# Patient Record
Sex: FEMALE | Race: ASIAN | Employment: FULL TIME | URBAN - METROPOLITAN AREA
[De-identification: names, ages, dates, MRNs, and addresses within clinical notes are randomized per-mention and may not be internally consistent; named-entity substitution may affect disease eponyms.]

---

## 2017-04-03 ENCOUNTER — OFFICE VISIT (OUTPATIENT)
Dept: OBGYN CLINIC | Age: 37
End: 2017-04-03

## 2017-04-03 VITALS
HEIGHT: 66 IN | BODY MASS INDEX: 28.93 KG/M2 | WEIGHT: 180 LBS | SYSTOLIC BLOOD PRESSURE: 120 MMHG | DIASTOLIC BLOOD PRESSURE: 80 MMHG

## 2017-04-03 DIAGNOSIS — N83.202 LEFT OVARIAN CYST: ICD-10-CM

## 2017-04-03 DIAGNOSIS — R10.2 PELVIC PAIN IN FEMALE: Primary | ICD-10-CM

## 2017-04-03 NOTE — PATIENT INSTRUCTIONS
Pelvic Pain: Care Instructions  Your Care Instructions    Pelvic pain, or pain in the lower belly, can have many causes. Often pelvic pain is not serious and gets better in a few days. If your pain continues or gets worse, you may need tests and treatment. Tell your doctor about any new symptoms. These may be signs of a serious problem. Follow-up care is a key part of your treatment and safety. Be sure to make and go to all appointments, and call your doctor if you are having problems. It's also a good idea to know your test results and keep a list of the medicines you take. How can you care for yourself at home? · Rest until you feel better. Lie down, and raise your legs by placing a pillow under your knees. · Drink plenty of fluids. You may find that small, frequent sips are easier on your stomach than if you drink a lot at once. Avoid drinks with carbonation or caffeine, such as soda pop, tea, or coffee. · Try eating several small meals instead of 2 or 3 large ones. Eat mild foods, such as rice, dry toast or crackers, bananas, and applesauce. Avoid fatty and spicy foods, other fruits, and alcohol until 48 hours after your symptoms have gone away. · Take an over-the-counter pain medicine, such as acetaminophen (Tylenol), ibuprofen (Advil, Motrin), or naproxen (Aleve). Read and follow all instructions on the label. · Do not take two or more pain medicines at the same time unless the doctor told you to. Many pain medicines have acetaminophen, which is Tylenol. Too much acetaminophen (Tylenol) can be harmful. · You can put a heating pad, a warm cloth, or moist heat on your belly to relieve pain. When should you call for help? Call 911 anytime you think you may need emergency care. For example, call if:  · You passed out (lost consciousness). Call your doctor now or seek immediate medical care if:  · Your pain is getting worse. · Your pain becomes focused in one area of your belly.   · You have severe vaginal bleeding. Severe means that you are soaking through your usual pads or tampons every hour for 2 or more hours and passing clots of blood. · You have new symptoms such as fever, urinary problems or unexpected vaginal bleeding. · You are dizzy or lightheaded, or you feel like you may faint. Watch closely for changes in your health, and be sure to contact your doctor if:  · You do not get better as expected. Where can you learn more? Go to http://mary-nuria.info/. Enter [phone] in the search box to learn more about \"Pelvic Pain: Care Instructions. \"  Current as of: October 13, 2016  Content Version: 11.2  © 1760-1786 [employer], [employer]. Care instructions adapted under license by [employer] (which disclaims liability or warranty for this information). If you have questions about a medical condition or this instruction, always ask your healthcare professional. [address] any warranty or liability for your use of this information.

## 2017-04-03 NOTE — PROGRESS NOTES
[facility]  [email or web address]  [phone]    Cookie Hull MD, FACOG       OB/GYN Problem visit    Chief Complaint:   Chief Complaint   Patient presents with    Pelvic Pain    Follow-up     trying to get pregnant       History of Present Illness: This is a new problem being evaluated by this provider. The patient is a 29 y.o.  female who reports having pelvic pain for about 2-3 weeks, that subsided this past week. Patient denies having any other problems. She reports the symptoms are has improved. Aggravating factors include none. Alleviating factors include none. She does not have other concerns. Trying to get pregnant x  about. LMP: Patient's last menstrual period was 2017 (exact date). PFSH:  Past Medical History:   Diagnosis Date    Papanicolaou smear for cervical cancer screening 2016    Neg pap and neg HPV     No past surgical history on file. Family History   Problem Relation Age of Onset    Family history unknown: Yes     Social History   Substance Use Topics    Smoking status: Never Smoker    Smokeless tobacco: None    Alcohol use No     No Known Allergies  Current Outpatient Prescriptions   Medication Sig    nitrofurantoin, macrocrystal-monohydrate, (MACROBID) 100 mg capsule TAKE 1 TABLET BY MOUTH TWICE A DAY    phenazopyridine (PYRIDIUM) 200 mg tablet TAKE 1 TABLET BY MOUTH EVERY 8 HOURS AS NEEDED     No current facility-administered medications for this visit.         Review of Systems:  History obtained from the patient  Constitutional: negative for fevers, chills and weight loss  ENT ROS: negative for - hearing change, oral lesions or visual changes  Respiratory: negative for cough, wheezing or dyspnea on exertion  Cardiovascular: negative for chest pain, irregular heart beats, exertional chest pressure/discomfort  Gastrointestinal: negative for dysphagia, nausea and vomiting  Genito-Urinary ROS:  see HPI  Inteument/breast: negative for rash, breast lump and nipple discharge  Musculoskeletal:negative for stiff joints, neck pain and muscle weakness  Endocrine ROS: negative for - breast changes, galactorrhea or temperature intolerance  Hematological and Lymphatic ROS: negative for - blood clots, bruising or swollen lymph nodes    Physical Exam:  Visit Vitals    /80    Ht 5' 6\" (1.676 m)    Wt 180 lb (81.6 kg)    BMI 29.05 kg/m2       GENERAL: alert, well appearing, and in no distress  HEAD: normocephalic, atraumatic. ABDOMEN: soft, nontender, nondistended, no masses or organomegaly   EGBUS: no lesions, no inflammation, no masses  VULVA: normal appearing vulva with no masses, tenderness or lesions  VAGINA: normal appearing vagina with normal color, no lesions, no discharge  CERVIX: normal appearing cervix without discharge or lesions, non tender  UTERUS: uterus is normal size, shape, consistency and nontender   ADNEXA: normal adnexa in size, nontender and no masses  NEURO: alert, oriented, normal speech    Assessment:  Encounter Diagnoses   Name Primary?  Pelvic pain in female Yes    Comment: resolved    Left ovarian cyst     Comment: appears benign       Plan:  The patient is advised that she should contact the office if she does not note improvement or if symptoms recur  Recommend follow up with PCP for non-gynecologic complaints and chronic medical problems. She should contact our office with any questions or concerns  She could keep her routine annual exam appointment. Reviewed last US: no cyst on left ovary, likely physiologic, should resolve  Reviewed timed intercourse, prenatal vitamins, menstrual charting. Discussed typical course/time to conception. Consider ISAIAH, number give after six months or RTC for consult,     Physician review of ultrasound performed by technician    Today's ultrasound report and images were reviewed and discussed with the patient.   Please see images and imaging report entered by technician in PACS for more detail and progress note and diagnosis entered by MD.    Vivian Ji MD    UTERUS IS ANTEVERTED, NORMAL IN SIZE AND HETEROGENOUS IN ECHOGENICITY. A RIGHT PEDUNCULATED FIBROID IS NOTED AND MEASURED ABOVE.  ENDOMETRIUM MEASURES 8-9MM IN THICKNESS. NO EVIDENCE OF MASS OR ABNORMALITY SEEN  WITHIN THE ENDOMETRIAL CAVITY. RIGHT OVARY APPEARS WITHIN NORMAL LIMITS. LEFT OVARY APPEARS TO HAVE A SIMPLE CYST THAT MEASURES 4.1 X 4.7 X 3.3CM. NO FREE FLUID SEEN IN THE CDS. No orders of the defined types were placed in this encounter. No results found for this visit on 04/03/17.

## 2017-04-03 NOTE — MR AVS SNAPSHOT
Visit Information Date & Time Provider Department Dept. Phone Encounter #  
 4/3/2017  3:00 PM Yoni Bah MD [facility] [phone] Upcoming Health Maintenance Date Due INFLUENZA AGE 9 TO ADULT 8/1/2016 PAP AKA CERVICAL CYTOLOGY 11/14/2019 Allergies as of 4/3/2017  Review Complete On: 4/3/2017 By: Jc Diaz LPN No Known Allergies Current Immunizations  Never Reviewed No immunizations on file. Not reviewed this visit Vitals BP Height(growth percentile) Weight(growth percentile) LMP BMI OB Status 120/80 5' 6\" (1.676 m) 180 lb (81.6 kg) 03/20/2017 (Exact Date) 29.05 kg/m2 Having regular periods Smoking Status Never Smoker BMI and BSA Data Body Mass Index Body Surface Area 29.05 kg/m 2 1.95 m 2 Your Updated Medication List  
  
   
This list is accurate as of: 4/3/17  3:43 PM.  Always use your most recent med list.  
  
  
  
  
 nitrofurantoin (macrocrystal-monohydrate) 100 mg capsule Commonly known as:  MACROBID  
TAKE 1 TABLET BY MOUTH TWICE A DAY phenazopyridine 200 mg tablet Commonly known as:  PYRIDIUM  
TAKE 1 TABLET BY MOUTH EVERY 8 HOURS AS NEEDED Patient Instructions Pelvic Pain: Care Instructions Your Care Instructions Pelvic pain, or pain in the lower belly, can have many causes. Often pelvic pain is not serious and gets better in a few days. If your pain continues or gets worse, you may need tests and treatment. Tell your doctor about any new symptoms. These may be signs of a serious problem. Follow-up care is a key part of your treatment and safety. Be sure to make and go to all appointments, and call your doctor if you are having problems. It's also a good idea to know your test results and keep a list of the medicines you take. How can you care for yourself at home? · Rest until you feel better. Lie down, and raise your legs by placing a pillow under your knees. · Drink plenty of fluids. You may find that small, frequent sips are easier on your stomach than if you drink a lot at once. Avoid drinks with carbonation or caffeine, such as soda pop, tea, or coffee. · Try eating several small meals instead of 2 or 3 large ones. Eat mild foods, such as rice, dry toast or crackers, bananas, and applesauce. Avoid fatty and spicy foods, other fruits, and alcohol until 48 hours after your symptoms have gone away. · Take an over-the-counter pain medicine, such as acetaminophen (Tylenol), ibuprofen (Advil, Motrin), or naproxen (Aleve). Read and follow all instructions on the label. · Do not take two or more pain medicines at the same time unless the doctor told you to. Many pain medicines have acetaminophen, which is Tylenol. Too much acetaminophen (Tylenol) can be harmful. · You can put a heating pad, a warm cloth, or moist heat on your belly to relieve pain. When should you call for help? Call 911 anytime you think you may need emergency care. For example, call if: 
· You passed out (lost consciousness). Call your doctor now or seek immediate medical care if: 
· Your pain is getting worse. · Your pain becomes focused in one area of your belly. · You have severe vaginal bleeding. Severe means that you are soaking through your usual pads or tampons every hour for 2 or more hours and passing clots of blood. · You have new symptoms such as fever, urinary problems or unexpected vaginal bleeding. · You are dizzy or lightheaded, or you feel like you may faint. Watch closely for changes in your health, and be sure to contact your doctor if: 
· You do not get better as expected. Where can you learn more? Go to http://mary-nuria.info/. Enter [phone] in the search box to learn more about \"Pelvic Pain: Care Instructions. \" Current as of: October 13, 2016 Content Version: 11.2 © 1860-0119 [employer]. Care instructions adapted under license by [employer] (which disclaims liability or warranty for this information). If you have questions about a medical condition or this instruction, always ask your healthcare professional. [address] any warranty or liability for your use of this information. Please provide this summary of care documentation to your next provider. Your primary care clinician is listed as Phys Other. If you have any questions after today's visit, please call [phone].

## 2017-06-08 ENCOUNTER — TELEPHONE (OUTPATIENT)
Dept: OBGYN CLINIC | Age: 37
End: 2017-06-08

## 2017-06-08 ENCOUNTER — OFFICE VISIT (OUTPATIENT)
Dept: OBGYN CLINIC | Age: 37
End: 2017-06-08

## 2017-06-08 VITALS
HEIGHT: 66 IN | WEIGHT: 177.8 LBS | DIASTOLIC BLOOD PRESSURE: 76 MMHG | RESPIRATION RATE: 18 BRPM | SYSTOLIC BLOOD PRESSURE: 118 MMHG | BODY MASS INDEX: 28.57 KG/M2

## 2017-06-08 DIAGNOSIS — N92.6 MISSED MENSES: ICD-10-CM

## 2017-06-08 DIAGNOSIS — O20.0 THREATENED ABORTION: Primary | ICD-10-CM

## 2017-06-08 DIAGNOSIS — N93.9 VAGINAL BLEEDING: ICD-10-CM

## 2017-06-08 NOTE — TELEPHONE ENCOUNTER
LMP 4/28/2017  UPT 2 weeks ago  6 weeks pregnancy  Stated she has an appt 6/29  Vaginal bleeding when she wipes  Started bleeding yesterday  Advised pt to rest pelvic,hydrate with fluids and eat  Patient stated she is at work and will go home and rest.  Please advise.

## 2017-06-08 NOTE — TELEPHONE ENCOUNTER
Notified pt with MD recommendation. Patient verbalized understanding and was given an US/F/U appt today.

## 2017-06-08 NOTE — PROGRESS NOTES
[facility] OB-GYN  [email or web address]  [phone]    Jose Blake MD, FACOG       OB/GYN Problem visit    Chief Complaint:   Chief Complaint   Patient presents with    Vaginal Bleeding    Irregular Menses       History of Present Illness: This is a new problem being evaluated by this provider. The patient is a 40 y.o.  female who had a positive home pregnancy test 2 weeks ago reports having vaginal bleeding for 2 days. Expressed that yesterday when she used the restroom and wiped, she noticed the blood. Per patient, the vaginal bleeding was lighter in color yesterday, and today has darkened. Has also, had some intermittent lower abdominal pain. She reports the symptoms are is unchanged. Aggravating factors include none. Alleviating factors include none. H/o SAB x1  She does not have other concerns. LMP: 2017. PFSH:  Past Medical History:   Diagnosis Date    Papanicolaou smear for cervical cancer screening 2016    Neg pap and neg HPV     History reviewed. No pertinent surgical history. Family History   Problem Relation Age of Onset    Family history unknown: Yes     Social History   Substance Use Topics    Smoking status: Never Smoker    Smokeless tobacco: None    Alcohol use No     No Known Allergies  Current Outpatient Prescriptions   Medication Sig    PRENATAL VIT CALC,IRON,FOLIC (PRENATAL VITAMIN PO) Take  by mouth.  nitrofurantoin, macrocrystal-monohydrate, (MACROBID) 100 mg capsule TAKE 1 TABLET BY MOUTH TWICE A DAY    phenazopyridine (PYRIDIUM) 200 mg tablet TAKE 1 TABLET BY MOUTH EVERY 8 HOURS AS NEEDED     No current facility-administered medications for this visit.         Review of Systems:  History obtained from the patient  Constitutional: negative for fevers, chills and weight loss  ENT ROS: negative for - hearing change, oral lesions or visual changes  Respiratory: negative for cough, wheezing or dyspnea on exertion  Cardiovascular: negative for chest pain, irregular heart beats, exertional chest pressure/discomfort  Gastrointestinal: negative for dysphagia, nausea and vomiting  Genito-Urinary ROS:  see HPI  Inteument/breast: negative for rash, breast lump and nipple discharge  Musculoskeletal:negative for stiff joints, neck pain and muscle weakness  Endocrine ROS: negative for - breast changes, galactorrhea or temperature intolerance  Hematological and Lymphatic ROS: negative for - blood clots, bruising or swollen lymph nodes    Physical Exam:  Visit Vitals    /76 (BP 1 Location: Left arm, BP Patient Position: Sitting)    Resp 18    Ht 5' 6\" (1.676 m)    Wt 177 lb 12.8 oz (80.6 kg)    BMI 28.7 kg/m2       GENERAL: alert, well appearing, and in no distress  HEAD: normocephalic, atraumatic. PULM: clear to auscultation, no wheezes, rales or rhonchi, symmetric air entry   COR: normal rate and regular rhythm, S1 and S2 normal   ABDOMEN: soft, nontender, nondistended, no masses or organomegaly   EGBUS: no lesions, no inflammation, no masses  VULVA: normal appearing vulva with no masses, tenderness or lesions  VAGINA: normal appearing vagina with normal color, no lesions, bloody tinged discharge  CERVIX: normal appearing cervix without discharge or lesions, non tender, closed   UTERUS: uterus is normal size, shape, consistency and nontender   ADNEXA: normal adnexa in size, nontender and no masses  NEURO: alert, oriented, normal speech    Assessment:  Encounter Diagnoses   Name Primary?  Threatened  Yes    Vaginal bleeding     Missed menses        Plan:  The patient is advised that she should contact the office if she does not note improvement or if symptoms recur  Recommend follow up with PCP for non-gynecologic complaints and chronic medical problems. She should contact our office with any questions or concerns  She could keep her routine annual exam appointment.    Bleeding precautions  Serial beta  Labs today  SAB/ectopic precautions notify MD for inc bleeding/pain  Disc safer pain meds/tylenol if needed  Disc lab peng opened on Saturday, try to go late am since ideally checking q 48 hrs  Will contact pt when results available    Orders Placed This Encounter    TOTAL HCG, QT.    TOTAL HCG, QT.    CBC W/O DIFF    TYPE & SCREEN       No results found for this visit on 06/08/17.       Encounter assisted by phone : [place]

## 2017-06-08 NOTE — MR AVS SNAPSHOT
Visit Information Date & Time Provider Department Dept. Phone Encounter #  
 6/8/2017  2:10 PM Latoya Zaragoza MD [facility] 90 [id number] Your Appointments 6/29/2017  9:30 AM  
ULTRASOUND with Shahida LipattiNABEEL ([address]) Appt Note: EOB/US + Dr. Mars Burroughs 4/28/POS UPT  
 [address] [facility] [address] [place] [address]  
  
    
 6/29/2017 10:00 AM  
ESTABLISHED PATIENT with MD Guillermo Edward ([address]) Appt Note: EOB/US + Dr. Mars Burroughs 4/28/POS UPT  
 [address] [facility] [address] [facility] [phone]  
[facility] [address] Upcoming Health Maintenance Date Due INFLUENZA AGE 9 TO ADULT 8/1/2017 PAP AKA CERVICAL CYTOLOGY 11/14/2019 Allergies as of 6/8/2017  Review Complete On: 6/8/2017 By: Anant Durant No Known Allergies Current Immunizations  Never Reviewed No immunizations on file. Not reviewed this visit You Were Diagnosed With   
  
 Codes Comments Vaginal bleeding    -  Primary ICD-10-CM: N93.9 ICD-9-CM: 623.8 Missed menses     ICD-10-CM: N92.6 ICD-9-CM: 626.4 Vitals BP Resp Height(growth percentile) Weight(growth percentile) LMP BMI  
 118/76 (BP 1 Location: Left arm, BP Patient Position: Sitting) 18 5' 6\" (1.676 m) 177 lb 12.8 oz (80.6 kg) 04/28/2017 (Exact Date) 28.7 kg/m2 OB Status Smoking Status Having regular periods Never Smoker Vitals History BMI and BSA Data Body Mass Index Body Surface Area 28.7 kg/m 2 1.94 m 2 Your Updated Medication List  
  
   
This list is accurate as of: 6/8/17  2:25 PM.  Always use your most recent med list.  
  
  
  
  
 nitrofurantoin (macrocrystal-monohydrate) 100 mg capsule Commonly known as:  MACROBID  
TAKE 1 TABLET BY MOUTH TWICE A DAY phenazopyridine 200 mg tablet Commonly known as:  PYRIDIUM  
TAKE 1 TABLET BY MOUTH EVERY 8 HOURS AS NEEDED PRENATAL VITAMIN PO Take  by mouth. We Performed the Following TOTAL HCG, QT. [53548 CPT(R)] To-Do List   
 06/10/2017 Lab:  TOTAL HCG, QT. Patient Instructions Learning About Pregnancy Your Care Instructions Your health in the early weeks of your pregnancy is particularly important for your babys health. Take good care of yourself. Anything you do that harms your body can also harm your baby. Make sure to go to all of your doctor appointments. Regular checkups will help keep you and your baby healthy. Follow-up care is a key part of your treatment and safety. Be sure to make and go to all appointments, and call your doctor if you are having problems. Its also a good idea to know your test results and keep a list of the medicines you take. How can you care for yourself at home? Diet · Eat a balanced diet. Make sure your diet includes plenty of beans, peas, and leafy green vegetables. · Do not skip meals or go for many hours without eating. If you are nauseated, try to eat a small, healthy snack every 2 to 3 hours. · Do not eat fish that has a high level of mercury, such as shark, swordfish, or mackerel. Do not eat more than one can of tuna each week. · Drink plenty of fluids, enough so that your urine is light yellow or clear like water. If you have kidney, heart, or liver disease and have to limit fluids, talk with your doctor before you increase the amount of fluids you drink. · Cut down on caffeine, such as coffee, tea, and cola. · Do not drink alcohol, such as beer, wine, or hard liquor. · Take a multivitamin that contains at least 400 micrograms (mcg) of folic acid to help prevent birth defects.  Fortified cereal and whole wheat bread are good additional sources of folic acid. · Increase the calcium in your diet. Try to drink a quart of skim milk each day. You may also take calcium supplements and choose foods such as cheese and yogurt. Lifestyle · Make sure you go to your follow-up appointments. · Get plenty of rest. You may be unusually tired while you are pregnant. · Get at least 30 minutes of exercise on most days of the week. Walking is a good choice. If you have not exercised in the past, start out slowly. Take several short walks each day. · Do not smoke. If you need help quitting, talk to your doctor about stop-smoking programs. These can increase your chances of quitting for good. · Do not touch cat feces or litter boxes. Also, wash your hands after you handle raw meat, and fully cook all meat before you eat it. Wear gloves when you work in the yard or garden, and wash your hands well when you are done. Cat feces, raw or undercooked meat, and contaminated dirt can cause an infection that may harm your baby or lead to a miscarriage. · Do not use saunas or hot tubs. Raising your body temperature may harm your baby. · Avoid chemical fumes, paint fumes, or poisons. · Do not use illegal drugs or alcohol. Medicines · Review all of your medicines with your doctor. Some of your routine medicines may need to be changed to protect your baby. · Use acetaminophen (Tylenol) to relieve minor problems, such as a mild headache or backache or a mild fever with cold symptoms. Do not use nonsteroidal anti-inflammatory drugs (NSAIDs), such as ibuprofen (Advil, Motrin) or naproxen (Aleve), unless your doctor says it is okay. · Do not take two or more pain medicines at the same time unless the doctor told you to. Many pain medicines have acetaminophen, which is Tylenol. Too much acetaminophen (Tylenol) can be harmful. · Take your medicines exactly as prescribed. Call your doctor if you think you are having a problem with your medicine. To manage morning sickness · If you feel sick when you first wake up, try eating a small snack (such as crackers) before you get out of bed. Allow some time to digest the snack, and then get out of bed slowly. · Do not skip meals or go for long periods without eating. An empty stomach can make nausea worse. · Eat small, frequent meals instead of three large meals each day. · Drink plenty of fluids. Sports drinks, such as Gatorade or Powerade, are good choices. · Eat foods that are high in protein but low in fat. · If you are taking iron supplements, ask your doctor if they are necessary. Iron can make nausea worse. · Avoid any smells, such as coffee, that make you feel sick. · Get lots of rest. Morning sickness may be worse when you are tired. Where can you learn more? Go to http://mary-nuria.info/. Enter R438 in the search box to learn more about \"Learning About Pregnancy. \" Current as of: May 30, 2016 Content Version: 11.2 © 2211-9062 [employer]. Care instructions adapted under license by [employer] (which disclaims liability or warranty for this information). If you have questions about a medical condition or this instruction, always ask your healthcare professional. [address] any warranty or liability for your use of this information. Please provide this summary of care documentation to your next provider. Your primary care clinician is listed as Phys Other. If you have any questions after today's visit, please call [phone].

## 2017-06-08 NOTE — PATIENT INSTRUCTIONS
Learning About Pregnancy  Your Care Instructions  Your health in the early weeks of your pregnancy is particularly important for your babys health. Take good care of yourself. Anything you do that harms your body can also harm your baby. Make sure to go to all of your doctor appointments. Regular checkups will help keep you and your baby healthy. Follow-up care is a key part of your treatment and safety. Be sure to make and go to all appointments, and call your doctor if you are having problems. Its also a good idea to know your test results and keep a list of the medicines you take. How can you care for yourself at home? Diet  · Eat a balanced diet. Make sure your diet includes plenty of beans, peas, and leafy green vegetables. · Do not skip meals or go for many hours without eating. If you are nauseated, try to eat a small, healthy snack every 2 to 3 hours. · Do not eat fish that has a high level of mercury, such as shark, swordfish, or mackerel. Do not eat more than one can of tuna each week. · Drink plenty of fluids, enough so that your urine is light yellow or clear like water. If you have kidney, heart, or liver disease and have to limit fluids, talk with your doctor before you increase the amount of fluids you drink. · Cut down on caffeine, such as coffee, tea, and cola. · Do not drink alcohol, such as beer, wine, or hard liquor. · Take a multivitamin that contains at least 400 micrograms (mcg) of folic acid to help prevent birth defects. Fortified cereal and whole wheat bread are good additional sources of folic acid. · Increase the calcium in your diet. Try to drink a quart of skim milk each day. You may also take calcium supplements and choose foods such as cheese and yogurt. Lifestyle  · Make sure you go to your follow-up appointments. · Get plenty of rest. You may be unusually tired while you are pregnant. · Get at least 30 minutes of exercise on most days of the week.  Walking is a good choice. If you have not exercised in the past, start out slowly. Take several short walks each day. · Do not smoke. If you need help quitting, talk to your doctor about stop-smoking programs. These can increase your chances of quitting for good. · Do not touch cat feces or litter boxes. Also, wash your hands after you handle raw meat, and fully cook all meat before you eat it. Wear gloves when you work in the yard or garden, and wash your hands well when you are done. Cat feces, raw or undercooked meat, and contaminated dirt can cause an infection that may harm your baby or lead to a miscarriage. · Do not use saunas or hot tubs. Raising your body temperature may harm your baby. · Avoid chemical fumes, paint fumes, or poisons. · Do not use illegal drugs or alcohol. Medicines  · Review all of your medicines with your doctor. Some of your routine medicines may need to be changed to protect your baby. · Use acetaminophen (Tylenol) to relieve minor problems, such as a mild headache or backache or a mild fever with cold symptoms. Do not use nonsteroidal anti-inflammatory drugs (NSAIDs), such as ibuprofen (Advil, Motrin) or naproxen (Aleve), unless your doctor says it is okay. · Do not take two or more pain medicines at the same time unless the doctor told you to. Many pain medicines have acetaminophen, which is Tylenol. Too much acetaminophen (Tylenol) can be harmful. · Take your medicines exactly as prescribed. Call your doctor if you think you are having a problem with your medicine. To manage morning sickness  · If you feel sick when you first wake up, try eating a small snack (such as crackers) before you get out of bed. Allow some time to digest the snack, and then get out of bed slowly. · Do not skip meals or go for long periods without eating. An empty stomach can make nausea worse. · Eat small, frequent meals instead of three large meals each day. · Drink plenty of fluids.  Sports drinks, such as Gatorade or Powerade, are good choices. · Eat foods that are high in protein but low in fat. · If you are taking iron supplements, ask your doctor if they are necessary. Iron can make nausea worse. · Avoid any smells, such as coffee, that make you feel sick. · Get lots of rest. Morning sickness may be worse when you are tired. Where can you learn more? Go to http://mary-nuria.info/. Enter F206 in the search box to learn more about \"Learning About Pregnancy. \"  Current as of: May 30, 2016  Content Version: 11.2  © 4565-7947 [employer], [employer]. Care instructions adapted under license by [employer] (which disclaims liability or warranty for this information). If you have questions about a medical condition or this instruction, always ask your healthcare professional. [address] any warranty or liability for your use of this information.

## 2017-06-09 LAB
ABO GROUP BLD: NORMAL
BLD GP AB SCN SERPL QL: NEGATIVE
ERYTHROCYTE [DISTWIDTH] IN BLOOD BY AUTOMATED COUNT: 13.5 % (ref 12.3–15.4)
HCG INTACT+B SERPL-ACNC: 162 MIU/ML
HCT VFR BLD AUTO: 41.8 % (ref 34–46.6)
HGB BLD-MCNC: 13.6 G/DL (ref 11.1–15.9)
MCH RBC QN AUTO: 28.5 PG (ref 26.6–33)
MCHC RBC AUTO-ENTMCNC: 32.5 G/DL (ref 31.5–35.7)
MCV RBC AUTO: 88 FL (ref 79–97)
PLATELET # BLD AUTO: 270 X10E3/UL (ref 150–379)
RBC # BLD AUTO: 4.77 X10E6/UL (ref 3.77–5.28)
RH BLD: POSITIVE
WBC # BLD AUTO: 9.6 X10E3/UL (ref 3.4–10.8)

## 2017-06-14 LAB — HCG INTACT+B SERPL-ACNC: 33 MIU/ML

## 2017-06-14 NOTE — PROGRESS NOTES
Abnormal results. Please notify pt. The beta level is falling, this may be a sign of an abnormal pregnancy or miscarriage. Rec repeat 2 days after last beta (ideally) or this week.

## 2017-06-15 ENCOUNTER — TELEPHONE (OUTPATIENT)
Dept: OBGYN CLINIC | Age: 37
End: 2017-06-15

## 2017-06-15 NOTE — PROGRESS NOTES
Patient advised of MD reviewed lab results and recommendations. This nurse utilized interpretor number [id number]. Patient verbalized understanding and was placed on the schedule for lab work at 11:20 am. Patient verbalized understanding thru the interpretor number [id number].

## 2017-06-15 NOTE — TELEPHONE ENCOUNTER
Advised patient of normal lab results and MD recommendations. Patient verbalized understanding. Returned call after receiving the abnormal results from the second beta and the patient did not answer. This nurse Asa Shipley about second lab results. Please advise patient to come in for a lab visit today or tomorrow for a repeat beta.     ----- Message from María Lamb MD sent at 6/9/2017  8:23 PM EDT -----  The results are normal.   Please notify patient. HCG: very early 3-4 wks: fu on repeat beta 2 d    6/14/17  Abnormal results. Please notify pt. The beta level is falling, this may be a sign of an abnormal pregnancy or miscarriage. Rec repeat 2 days after last beta (ideally) or this week.

## 2017-06-16 ENCOUNTER — LAB ONLY (OUTPATIENT)
Dept: OBGYN CLINIC | Age: 37
End: 2017-06-16

## 2017-06-16 DIAGNOSIS — O20.0 ABORTION, THREATENED: Primary | ICD-10-CM

## 2017-06-17 LAB — HCG INTACT+B SERPL-ACNC: <1 MIU/ML

## 2017-06-19 NOTE — PROGRESS NOTES
The results are normal.   Update ob history  Please notify patient. Beta level c/w a complete miscarriage. Menses should resume within 2-6 wks, but notify MD if no menses with in 3 months. Rec consult visit to discuss future pregnancy options in 2-4 wks, or when and if pt desires.

## 2017-06-20 ENCOUNTER — TELEPHONE (OUTPATIENT)
Dept: OBGYN CLINIC | Age: 37
End: 2017-06-20

## 2017-06-22 NOTE — TELEPHONE ENCOUNTER
Patient notified of results by triage nurse. See below. Notes Recorded by Rai Roman LPN on 1/06/2565 at 9:97 PM  Patient returned call and was given results per MD recommendation.  She verbalized understanding

## 2017-12-21 ENCOUNTER — OFFICE VISIT (OUTPATIENT)
Dept: OBGYN CLINIC | Age: 37
End: 2017-12-21

## 2017-12-21 VITALS
SYSTOLIC BLOOD PRESSURE: 112 MMHG | BODY MASS INDEX: 28.54 KG/M2 | DIASTOLIC BLOOD PRESSURE: 70 MMHG | WEIGHT: 177.6 LBS | HEIGHT: 66 IN

## 2017-12-21 DIAGNOSIS — O09.521 ELDERLY MULTIGRAVIDA IN FIRST TRIMESTER: ICD-10-CM

## 2017-12-21 DIAGNOSIS — Z3A.10 10 WEEKS GESTATION OF PREGNANCY: ICD-10-CM

## 2017-12-21 DIAGNOSIS — Z34.90 ENCOUNTER FOR SUPERVISION OF NORMAL PREGNANCY, ANTEPARTUM, UNSPECIFIED GRAVIDITY: Primary | ICD-10-CM

## 2017-12-21 NOTE — MR AVS SNAPSHOT
Visit Information Date & Time Provider Department Dept. Phone Encounter #  
 2017  8:50 AM Bridget Dumont MD [address] [id number] Upcoming Health Maintenance Date Due Influenza Age 5 to Adult 2017 PAP AKA CERVICAL CYTOLOGY 2019 Allergies as of 2017  Review Complete On: 2017 By: Trista Sanz LPN No Known Allergies Current Immunizations  Never Reviewed No immunizations on file. Not reviewed this visit You Were Diagnosed With   
  
 Codes Comments Encounter for supervision of normal pregnancy, antepartum, unspecified     -  Primary ICD-10-CM: Z34.90 ICD-9-CM: V22.1 Vitals Height(growth percentile) Weight(growth percentile) LMP BMI OB Status Smoking Status 5' 6\" (1.676 m) 177 lb 9.6 oz (80.6 kg) 10/25/2017 (Exact Date) 28.67 kg/m2 Pregnant Never Smoker BMI and BSA Data Body Mass Index Body Surface Area  
 28.67 kg/m 2 1.94 m 2 Your Updated Medication List  
  
   
This list is accurate as of: 17  9:20 AM.  Always use your most recent med list.  
  
  
  
  
 nitrofurantoin (macrocrystal-monohydrate) 100 mg capsule Commonly known as:  MACROBID  
TAKE 1 TABLET BY MOUTH TWICE A DAY phenazopyridine 200 mg tablet Commonly known as:  PYRIDIUM  
TAKE 1 TABLET BY MOUTH EVERY 8 HOURS AS NEEDED PRENATAL VITAMIN PO Take  by mouth. Patient Instructions Weeks 6 to 10 of Your Pregnancy: Care Instructions Your Care Instructions Congratulations on your pregnancy. This is an exciting and important time for you. During the first 6 to 10 weeks of your pregnancy, your body goes through many changes. Your baby grows very fast, even though you cannot feel it yet. You may start to notice that you feel different, both in your body and your emotions.  Because each woman's pregnancy is unique, there is no right way to feel. You may feel the healthiest you have ever been, or you may feel tired or sick to your stomach (\"morning sickness\"). These early weeks are a time to make healthy choices and to eat the best foods for you and your baby. This care sheet will give you some ideas. This is also a good time to think about birth defects testing. These are tests done during pregnancy to look for possible problems with the baby. First trimester tests for birth defects can be done between 8 and 17 weeks of pregnancy, depending on the test. Talk with your doctor about what kinds of tests are available. Follow-up care is a key part of your treatment and safety. Be sure to make and go to all appointments, and call your doctor if you are having problems. It's also a good idea to know your test results and keep a list of the medicines you take. How can you care for yourself at home? Eat well · Eat at least 3 meals and 2 healthy snacks every day. Eat fresh, whole foods, including: ¨ 7 or more servings of bread, tortillas, cereal, rice, pasta, or oatmeal. 
¨ 3 or more servings of vegetables, especially leafy green vegetables. ¨ 2 or more servings of fruits. ¨ 3 or more servings of milk, yogurt, or cheese. ¨ 2 or more servings of meat, turkey, chicken, fish, eggs, or dried beans. · Drink plenty of fluids, especially water. Avoid sodas and other sweetened drinks. · Choose foods that have important vitamins for your baby, such as calcium, iron, and folate. ¨ Dairy products, tofu, canned fish with bones, almonds, broccoli, dark leafy greens, corn tortillas, and fortified orange juice are good sources of calcium. ¨ Beef, poultry, liver, spinach, lentils, dried beans, fortified cereals, and dried fruits are rich in iron. ¨ Dark leafy greens, broccoli, asparagus, liver, fortified cereals, orange juice, peanuts, and almonds are good sources of folate. · Avoid foods that could harm your baby. ¨ Do not eat raw or undercooked meat, chicken, or fish (such as sushi or raw oysters). ¨ Do not eat raw eggs or foods that contain raw eggs, such as Caesar dressing. ¨ Do not eat soft cheeses and unpasteurized dairy foods, such as Brie, feta, or blue cheese. ¨ Do not eat fish that contains a lot of mercury, such as shark, swordfish, tilefish, or yosi mackerel. Do not eat more than 6 ounces of tuna each week. ¨ Do not eat raw sprouts, especially alfalfa sprouts. ¨ Cut down on caffeine, such as coffee, tea, and cola. Protect yourself and your baby · Do not touch dori litter or cat feces. They can cause an infection that could harm your baby. · High body temperature can be harmful to your baby. So if you want to use a sauna or hot tub, be sure to talk to your doctor about how to use it safely. [place] with morning sickness · Sip small amounts of water, juices, or shakes. Try drinking between meals, not with meals. · Eat 5 or 6 small meals a day. Try dry toast or crackers when you first get up, and eat breakfast a little later. · Avoid spicy, greasy, and fatty foods. · When you feel sick, open your windows or go for a short walk to get fresh air. · Try nausea wristbands. These help some women. · Tell your doctor if you think your prenatal vitamins make you sick. Where can you learn more? Go to http://mary-nuria.info/. Enter G112 in the search box to learn more about \"Weeks 6 to 10 of Your Pregnancy: Care Instructions. \" Current as of: March 16, 2017 Content Version: 11.4 © 8681-5461 [employer]. Care instructions adapted under license by [employer] (which disclaims liability or warranty for this information). If you have questions about a medical condition or this instruction, always ask your healthcare professional. [address] any warranty or liability for your use of this information. Please provide this summary of care documentation to your next provider. Your primary care clinician is listed as Phys Other. If you have any questions after today's visit, please call [phone].

## 2017-12-21 NOTE — PROGRESS NOTES
[facility]  [email or web address]  [phone]    Angelia Rodriguez MD, [address] [facility]     Chief complaint:  Irregular cycles  Last cycle; Patient's last menstrual period was 10/25/2017 (exact date). This is a new concern and an evaluation is planned. Current pregnancy history:  Katerina Arthur is a , 40 y.o. female    She presents for the evaluation of irregular menses and a positive pregnancy test.    LMP history:  Patient's last menstrual period was 10/25/2017 (exact date). .  The date of the beginning of her last menstrual period is certain. Her menses are regular. Her cycles occur about every 4 weeks. A urine pregnancy test was positive about 1 month ago. She was not using contraception at the estimated time of conception. Based on her LMP, her EGA is 8 weeks,1 days with and EDC of 18. Flu shot received at PCP 10/2/2017. Would like to review genetic testing options with provider. Ultrasound data:  She had an ultrasound today which revealed a viable herrera pregnancy with a gestational age of 10 weeks and 0 days giving an EDC of 18. Pregnancy symptoms:  She reports none. She denies all. Since she found out she is pregnant, she has there was no change in patient's weight. She reports her prepregnancy weight as 177 pounds. Relevant past pregnancy history:  She has the following pregnancy history: SAB x 2  She does not have a history of  delivery. She does not have a history of a prior  section. 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    Substance history:   She does not report current tobacco use. She does not report current alcohol use. She does not report current drug use. Exposure history:    There are not indoor cat(s) in the home. The patient was instructed not to change cat litter boxes during pregnancy. She does not report close contact with children on a regular basis. She has not chicken pox or the vaccine in the past.   Patient does not report issues with domestic violence. Genetic Screening/Teratology Counseling:   (Includes patient, baby's father, or anyone in either family with:)  3.  Patient's age >/= 28 at EDC?--37      FOB age: 37years old. 2.  Thalassemia ([facility], [facility], [address], or  background): MCV<80?--yes and cambodian  3. Neural tube defect (meningomyelocele, spina bifida, anencephaly)? --no  4. Congenital heart defect?--no  5. Down syndrome?--no  6. Tanmay-Sachs ([address] [place] [address])? --no  7. Canavan's Disease?--no  8. Familial Dysautonomia?--no   9. Sickle cell disease or trait ()? --no   Has she been tested for sickle trait: No  10. Hemophilia or other blood disorders?--no  11. Muscular dystrophy?--no  12. Cystic fibrosis? --no  13. Merrill's Chorea?--no  14. Mental retardation/autism (if yes was person tested for Fragile X)?-no  15. Other inherited genetic or chromosomal disorder?- no  16. Maternal metabolic disorder (DM, PKU, etc)? --no  17. Patient or FOB with a child with a birth defect not listed above?--no  17a. Patient or FOB with a birth defect themselves?--no  25. Recurrent pregnancy loss, or stillbirth?--no  19. Any medications since LMP other than prenatal vitamins (include vitamins, supplements, OTC meds, drugs, alcohol)? -- PNV  20. Any other genetic/environmental exposure to discuss?--no. Infection History:  1. Lives with someone with TB or TB exposed?--no  2. Patient or partner has history of genital herpes?--no  3. Rash or viral illness since LMP?--no  4. History of STD (GC, CT, HPV, syphilis, HIV)? --no  5. Have you received a flu vaccine for the most recent flu season? -- no  6.   Have you or your sexual partner(s) travelled to a [place] [employer] area in the last 3 months? -- no    Past Medical History:   Diagnosis Date    Papanicolaou smear for cervical cancer screening 2016    Neg pap and neg HPV     No past surgical history on file. Social History     Occupational History    Not on file. Social History Main Topics    Smoking status: Never Smoker    Smokeless tobacco: Not on file    Alcohol use No    Drug use: No    Sexual activity: Yes     Partners: Male     Birth control/ protection: None     Family History   Problem Relation Age of Onset    Family history unknown: Yes     OB History    Para Term  AB Living   3    2    SAB TAB Ectopic Molar Multiple Live Births   2           # Outcome Date GA Lbr Akhil/2nd Weight Sex Delivery Anes PTL Lv   3 Current            2 SAB /15/17           1 SAB               Obstetric Comments   sab , early     No Known Allergies  Prior to Admission medications    Medication Sig Start Date End Date Taking? Authorizing Provider   PRENATAL VIT CALC,IRON,FOLIC (PRENATAL VITAMIN PO) Take  by mouth.    Yes Historical Provider   nitrofurantoin, macrocrystal-monohydrate, (MACROBID) 100 mg capsule TAKE 1 TABLET BY MOUTH TWICE A DAY 16   Historical Provider   phenazopyridine (PYRIDIUM) 200 mg tablet TAKE 1 TABLET BY MOUTH EVERY 8 HOURS AS NEEDED 16   Historical Provider        Review of Systems - History obtained from the patient  Constitutional: negative for weight loss, fever, night sweats  HEENT: negative for hearing loss, earache, congestion, snoring, sorethroat  CV: negative for chest pain, palpitations, edema  Resp: negative for cough, shortness of breath, wheezing  GI: negative for change in bowel habits, abdominal pain, black or bloody stools  : negative for frequency, dysuria, hematuria, vaginal discharge  MSK: negative for back pain, joint pain, muscle pain  Breast: negative for breast lumps, nipple discharge, galactorrhea  Skin :negative for itching, rash, hives  Neuro: negative for dizziness, headache, confusion, weakness  Psych: negative for anxiety, depression, change in mood  Heme/lymph: negative for bleeding, bruising, pallor    Objective:  Visit Vitals    /70    Ht 5' 6\" (1.676 m)    Wt 177 lb 9.6 oz (80.6 kg)    LMP 10/25/2017 (Exact Date)    BMI 28.67 kg/m2       Physical Exam:   Constitutional  · Appearance: well-nourished, well developed, alert, in no acute distress    HENT  · Head  · Face: appears normal  · Eyes: appear normal  · Ears: normal  · Mouth: normal  · Lips: no lesions    Neck  · Inspection/Palpation: normal appearance, no masses or tenderness  · Lymph Nodes: no lymphadenopathy present  · Thyroid: gland size normal, nontender, no nodules or masses present on palpation    Chest  · Respiratory Effort: breathing unlabored  · Auscultation: normal breath sounds    Cardiovascular  · Heart:  · Auscultation: regular rate and rhythm without murmur    Breasts  · Inspection of Breasts: breasts symmetrical, no skin changes, no discharge present, nipple appearance normal, no skin retraction present  · Palpation of Breasts and Axillae: no masses present on palpation, no breast tenderness  · Axillary Lymph Nodes: no lymphadenopathy present    Gastrointestinal  · Abdominal Examination: abdomen non-tender to palpation, normal bowel sounds, no masses present  · Liver and spleen: no hepatomegaly present, spleen not palpable  · Hernias: no hernias identified    Genitourinary  · External Genitalia: normal appearance for age, no discharge present, no tenderness present, no inflammatory lesions present, no masses present, no atrophy present  · Vagina: normal vaginal vault without central or paravaginal defects, no discharge present, no inflammatory lesions present, no masses present  · Bladder: non-tender to palpation  · Urethra: appears normal  · Cervix: normal appearing with discharge or lesions, os closed  · Uterus: enlarged, normal shape, soft  · Adnexa: no adnexal tenderness present, no adnexal masses present  · Perineum: perineum within normal limits, no evidence of trauma, no rashes or skin lesions present  · Anus: anus within normal limits, no hemorrhoids present  · Inguinal Lymph Nodes: no lymphadenopathy present    Skin  · General Inspection: no rash, no lesions identified    Neurologic/Psychiatric  · Mental Status:  · Orientation: grossly oriented to person, place and time  · Mood and Affect: mood normal, affect appropriate    Assessment:   Irregular cycles  Encounter Diagnoses   Name Primary?  Encounter for supervision of normal pregnancy, antepartum, unspecified  Yes    10 weeks gestation of pregnancy     Elderly multigravida in first trimester      Due date: us    Plan:   We discussed options of genetic screening and diagnostic testing including:  CF testing, CVS, amniocentesis first trimester screening/NT, MSAFP, and NIPT (handout given to patient for review and consent)  She is not interested in prenatal genetic testing of her fetus. Plan: 20 wk FS, NIPS  The course of pregnancy discussed including visit schedule, ultrasounds, lab testing, etc.  Pt advised to avoid alcoholic beverages and illicit/recreational drugs use  Recommend taking prenatal vitamins or folic acid daily with DHA/fish oil. The hospital and practice style discussed with coverage system. We discussed nutrition, toxoplasmosis precautions, sexual activity, exercise, need for influenza vaccine, environmental and work hazards, travel advice, screen for domestic violence, need for seat belts. We discussed seafood, unpasteurized dairy products, deli meat, artificial sweeteners, and caffeine intake. We recommend avoiding chemical and toxin exposures when possible. Information on prenatal and breastfeeding classes given. Information on circumcision given  Patient encouraged not to smoke. Discussed current prescription drug use.  Given medication list.  Discussed the use of over the counter medications and chemicals. She is advised to contact her MD with any questions. Pt understands risk of hemorrhage during pregnancy and post delivery and would accept blood products if necessary in life-threatening emergencies  We discussed signs and symptoms of abnormal pregnancies and miscarriage. Handouts given to pt. We discussed risks of AMA: including but not limited to the patient's risk for adverse outcome, including miscarriage, pregnancy loss, stillbirth, fetal chromosomal and anatomic anomalies,  delivery, low birth weight, intrauterine growth restriction, placenta previa, gestational diabetes, preeclampsia, and  delivery. I recommended a genetics and MFM consult to review genetic and OB risks in detail. Physician review of ultrasound performed by technician  Today's ultrasound report and images were reviewed and discussed with the patient. Please see images and imaging report entered by technician in PACS for more detail and progress note and diagnosis entered by MD.    Veronica Lynn MD    Orders Placed This Encounter    CULTURE, URINE    HEP B SURFACE AG    HIV SCREEN, [address] [place] [address]. W/REFLEX CONFIRM    CBC W/O DIFF    RUBELLA AB, IGG    T PALLIDUM SCREEN W/REFLEX    CHLAMYDIA/GC PCR    TYPE, ABO & RH    ANTIBODY SCREEN     Follow-up Disposition: Not on File   TA ULTRASOUND PERFORMED. A SINGLE VIABLE 9W0D IUP IS SEEN WITH NORMAL CARDIAC RHYTHM. GESTATIONAL AGE BASED ON TODAYS US.  A NORMAL APPEARING [address] IS SEEN. RIGHT & LEFT ADNEXA APPEAR WITHIN NORMAL LIMITS. NO FREE FLUID SEEN IN THE CDS.

## 2017-12-21 NOTE — PATIENT INSTRUCTIONS
Weeks 6 to 10 of Your Pregnancy: Care Instructions  Your Care Instructions    Congratulations on your pregnancy. This is an exciting and important time for you. During the first 6 to 10 weeks of your pregnancy, your body goes through many changes. Your baby grows very fast, even though you cannot feel it yet. You may start to notice that you feel different, both in your body and your emotions. Because each woman's pregnancy is unique, there is no right way to feel. You may feel the healthiest you have ever been, or you may feel tired or sick to your stomach (\"morning sickness\"). These early weeks are a time to make healthy choices and to eat the best foods for you and your baby. This care sheet will give you some ideas. This is also a good time to think about birth defects testing. These are tests done during pregnancy to look for possible problems with the baby. First trimester tests for birth defects can be done between 8 and 17 weeks of pregnancy, depending on the test. Talk with your doctor about what kinds of tests are available. Follow-up care is a key part of your treatment and safety. Be sure to make and go to all appointments, and call your doctor if you are having problems. It's also a good idea to know your test results and keep a list of the medicines you take. How can you care for yourself at home? Eat well  · Eat at least 3 meals and 2 healthy snacks every day. Eat fresh, whole foods, including:  ¨ 7 or more servings of bread, tortillas, cereal, rice, pasta, or oatmeal.  ¨ 3 or more servings of vegetables, especially leafy green vegetables. ¨ 2 or more servings of fruits. ¨ 3 or more servings of milk, yogurt, or cheese. ¨ 2 or more servings of meat, turkey, chicken, fish, eggs, or dried beans. · Drink plenty of fluids, especially water. Avoid sodas and other sweetened drinks. · Choose foods that have important vitamins for your baby, such as calcium, iron, and folate.   ¨ Dairy products, tofu, canned fish with bones, almonds, broccoli, dark leafy greens, corn tortillas, and fortified orange juice are good sources of calcium. ¨ Beef, poultry, liver, spinach, lentils, dried beans, fortified cereals, and dried fruits are rich in iron. ¨ Dark leafy greens, broccoli, asparagus, liver, fortified cereals, orange juice, peanuts, and almonds are good sources of folate. · Avoid foods that could harm your baby. ¨ Do not eat raw or undercooked meat, chicken, or fish (such as sushi or raw oysters). ¨ Do not eat raw eggs or foods that contain raw eggs, such as Caesar dressing. ¨ Do not eat soft cheeses and unpasteurized dairy foods, such as Brie, feta, or blue cheese. ¨ Do not eat fish that contains a lot of mercury, such as shark, swordfish, tilefish, or yosi mackerel. Do not eat more than 6 ounces of tuna each week. ¨ Do not eat raw sprouts, especially alfalfa sprouts. ¨ Cut down on caffeine, such as coffee, tea, and cola. Protect yourself and your baby  · Do not touch dori litter or cat feces. They can cause an infection that could harm your baby. · High body temperature can be harmful to your baby. So if you want to use a sauna or hot tub, be sure to talk to your doctor about how to use it safely. Atco with morning sickness  · Sip small amounts of water, juices, or shakes. Try drinking between meals, not with meals. · Eat 5 or 6 small meals a day. Try dry toast or crackers when you first get up, and eat breakfast a little later. · Avoid spicy, greasy, and fatty foods. · When you feel sick, open your windows or go for a short walk to get fresh air. · Try nausea wristbands. These help some women. · Tell your doctor if you think your prenatal vitamins make you sick. Where can you learn more? Go to http://prakash.info/. Enter G112 in the search box to learn more about \"Weeks 6 to 10 of Your Pregnancy: Care Instructions. \"  Current as of: March 16, 2017  Content Version: 11.4  © 0153-3821 Healthwise, Incorporated. Care instructions adapted under license by [employer] (which disclaims liability or warranty for this information). If you have questions about a medical condition or this instruction, always ask your healthcare professional. [address] any warranty or liability for your use of this information.

## 2017-12-22 LAB — BACTERIA UR CULT: NO GROWTH

## 2017-12-23 LAB
ABO GROUP BLD: NORMAL
ANTIBODY SCREEN, EXTERNAL: NEGATIVE
BLD GP AB SCN SERPL QL: NEGATIVE
C TRACH RRNA SPEC QL NAA+PROBE: NEGATIVE
CHLAMYDIA, EXTERNAL: NEGATIVE
ERYTHROCYTE [DISTWIDTH] IN BLOOD BY AUTOMATED COUNT: 13.7 % (ref 12.3–15.4)
HBSAG, EXTERNAL: NEGATIVE
HBV SURFACE AG SERPL QL IA: NEGATIVE
HCT VFR BLD AUTO: 42.2 % (ref 34–46.6)
HCT, EXTERNAL: 42.4
HGB BLD-MCNC: 13.4 G/DL (ref 11.1–15.9)
HGB, EXTERNAL: 13.4
HIV 1+2 AB+HIV1 P24 AG SERPL QL IA: NON REACTIVE
HIV, EXTERNAL: NON REACTIVE
MCH RBC QN AUTO: 28.7 PG (ref 26.6–33)
MCHC RBC AUTO-ENTMCNC: 31.8 G/DL (ref 31.5–35.7)
MCV RBC AUTO: 90 FL (ref 79–97)
N GONORRHOEA RRNA SPEC QL NAA+PROBE: NEGATIVE
N. GONORRHEA, EXTERNAL: NEGATIVE
PLATELET # BLD AUTO: 265 X10E3/UL (ref 150–379)
PLATELET CNT,   EXTERNAL: 265
RBC # BLD AUTO: 4.67 X10E6/UL (ref 3.77–5.28)
RH BLD: POSITIVE
RUBELLA, EXTERNAL: NORMAL
RUBV IGG SERPL IA-ACNC: 23.4 INDEX
T PALLIDUM AB SER QL IA: NEGATIVE
T. PALLIDUM, EXTERNAL: NEGATIVE
TYPE, ABO & RH, EXTERNAL: NORMAL
URINALYSIS, EXTERNAL: NEGATIVE
WBC # BLD AUTO: 8.6 X10E3/UL (ref 3.4–10.8)

## 2018-02-01 ENCOUNTER — TELEPHONE (OUTPATIENT)
Dept: OBGYN CLINIC | Age: 38
End: 2018-02-01

## 2018-02-01 DIAGNOSIS — Z34.80 SUPERVISION OF OTHER NORMAL PREGNANCY, ANTEPARTUM: Primary | ICD-10-CM

## 2018-02-01 NOTE — TELEPHONE ENCOUNTER
Call received at 9:02am      40year old  15wod pregnant patient last seen in the office on 18. Patient denies vaginal bleeding and cramping. Patient calling regarding when she is to be seen next. This nurse placed the patient on the schedule for 1:50pm on 18 for 16 week follow up. This nurse attempted to explain the regarding [facility] referral and that she will be contacted for that appointment. Patient verbalized understanding.

## 2018-02-08 ENCOUNTER — ROUTINE PRENATAL (OUTPATIENT)
Dept: OBGYN CLINIC | Age: 38
End: 2018-02-08

## 2018-02-08 VITALS
WEIGHT: 184 LBS | DIASTOLIC BLOOD PRESSURE: 72 MMHG | SYSTOLIC BLOOD PRESSURE: 114 MMHG | BODY MASS INDEX: 29.57 KG/M2 | HEIGHT: 66 IN

## 2018-02-08 DIAGNOSIS — O09.521 ELDERLY MULTIGRAVIDA IN FIRST TRIMESTER: Primary | ICD-10-CM

## 2018-02-08 DIAGNOSIS — Z3A.16 16 WEEKS GESTATION OF PREGNANCY: ICD-10-CM

## 2018-02-08 NOTE — MR AVS SNAPSHOT
[address] [facility] [address]7 [facility] 
[phone] Patient: Samy Kc MRN: [id number] BLN:[id number] Visit Information Date & Time Provider Department Dept. Phone Encounter #  
 2/8/2018  1:50 PM Shimon More MD [facility] [phone] [id number] Upcoming Health Maintenance Date Due  
 PAP AKA CERVICAL CYTOLOGY 11/14/2019 Allergies as of 2/8/2018  Review Complete On: 2/8/2018 By: Shimon More MD  
 No Known Allergies Current Immunizations  Never Reviewed No immunizations on file. Not reviewed this visit Vitals BP Height(growth percentile) Weight(growth percentile) LMP BMI OB Status 114/72 5' 6\" (1.676 m) 184 lb (83.5 kg) 10/25/2017 (Exact Date) 29.7 kg/m2 Pregnant Smoking Status Never Smoker BMI and BSA Data Body Mass Index Body Surface Area  
 29.7 kg/m 2 1.97 m 2 Your Updated Medication List  
  
   
This list is accurate as of: 2/8/18  2:37 PM.  Always use your most recent med list.  
  
  
  
  
 nitrofurantoin (macrocrystal-monohydrate) 100 mg capsule Commonly known as:  MACROBID  
TAKE 1 TABLET BY MOUTH TWICE A DAY phenazopyridine 200 mg tablet Commonly known as:  PYRIDIUM  
TAKE 1 TABLET BY MOUTH EVERY 8 HOURS AS NEEDED PRENATAL VITAMIN PO Take  by mouth. To-Do List   
 03/16/2018 8:45 AM  
  Appointment with ULTRASOUND 1 [facility] at [address] [place] [address] [phone] ([phone]) Patient Instructions Weeks 14 to 18 of Your Pregnancy: Care Instructions Your Care Instructions During this time, you may start to \"show,\" so that you look pregnant to people around you. You may also notice some changes in your skin, such as itchy spots on your palms or acne on your face.  
Your baby is now able to pass urine, and your baby's first stool (meconium) is starting to collect in his or her intestines. Hair is also beginning to grow on your baby's head. At your next visit, between weeks 18 and 20, your doctor may do an ultrasound test. The test allows your doctor to check for certain problems. Your doctor can also tell the sex of your baby. This is a good time to think about whether you want to know whether your baby is a boy or a girl. Talk to your doctor about getting a flu shot to help keep you healthy during your pregnancy. As your pregnancy moves along, it is common to worry or feel anxious. Your body is changing a lot. And you are thinking about giving birth, the health of your baby, and becoming a parent. You can learn to cope with any anxiety and stress you feel. Follow-up care is a key part of your treatment and safety. Be sure to make and go to all appointments, and call your doctor if you are having problems. It's also a good idea to know your test results and keep a list of the medicines you take. How can you care for yourself at home? ?Reduce stress ? · Ask for help with cooking and housekeeping. ? · Figure out who or what causes your stress. Avoid these people or situations as much as possible. ? · Relax every day. Taking 10- to 15-minute breaks can make a big difference. Take a walk, listen to music, or take a warm bath. ? · Learn relaxation techniques at prenatal or yoga class. Or buy a relaxation tape. ? · List your fears about having a baby and becoming a parent. Share the list with someone you trust. Decide which worries are really small, and try to let them go. Exercise ? · If you did not exercise much before pregnancy, start slowly. Walking is best. Trang Armor yourself, and do a little more every day. ? · Brisk walking, easy jogging, low-impact aerobics, water aerobics, and yoga are good choices. Some sports, such as scuba diving, horseback riding, downhill skiing, gymnastics, and water skiing, are not a good idea. ? · Try to do at least 2½ hours a week of moderate exercise, such as a fast walk. One way to do this is to be active 30 minutes a day, at least 5 days a week. It's fine to be active in blocks of 10 minutes or more throughout your day and week. ? · Wear loose clothing. And wear shoes and a bra that provide good support. ? · Warm up and cool down to start and finish your exercise. ? · If you want to use weights, be sure to use light weights. They reduce stress on your joints. ?Stay at the best weight for you ? · Experts recommend that you gain about 1 pound a month during the first 3 months of your pregnancy. ? · Experts recommend that you gain about 1 pound a week during your last 6 months of pregnancy, for a total weight gain of 25 to 35 pounds. ? · If you are underweight, you will need to gain more weight (about 28 to 40 pounds). ? · If you are overweight, you may not need to gain as much weight (about 15 to 25 pounds). ? · If you are gaining weight too fast, use common sense. Exercise every day, and limit sweets, fast foods, and fats. Choose lean meats, fruits, and vegetables. ? · If you are having twins or more, your doctor may refer you to a dietitian. Where can you learn more? Go to http://mary-nuria.info/. Enter T596 in the search box to learn more about \"Weeks 14 to 18 of Your Pregnancy: Care Instructions. \" Current as of: March 16, 2017 Content Version: 11.4 © 7501-3620 Healthwise, Incorporated. Care instructions adapted under license by [employer] (which disclaims liability or warranty for this information). If you have questions about a medical condition or this instruction, always ask your healthcare professional. [address] any warranty or liability for your use of this information. Please provide this summary of care documentation to your next provider. Your primary care clinician is listed as Phys Other. If you have any questions after today's visit, please call [phone].

## 2018-02-08 NOTE — PATIENT INSTRUCTIONS
Weeks 14 to 18 of Your Pregnancy: Care Instructions  Your Care Instructions    During this time, you may start to \"show,\" so that you look pregnant to people around you. You may also notice some changes in your skin, such as itchy spots on your palms or acne on your face. Your baby is now able to pass urine, and your baby's first stool (meconium) is starting to collect in his or her intestines. Hair is also beginning to grow on your baby's head. At your next visit, between weeks 18 and 20, your doctor may do an ultrasound test. The test allows your doctor to check for certain problems. Your doctor can also tell the sex of your baby. This is a good time to think about whether you want to know whether your baby is a boy or a girl. Talk to your doctor about getting a flu shot to help keep you healthy during your pregnancy. As your pregnancy moves along, it is common to worry or feel anxious. Your body is changing a lot. And you are thinking about giving birth, the health of your baby, and becoming a parent. You can learn to cope with any anxiety and stress you feel. Follow-up care is a key part of your treatment and safety. Be sure to make and go to all appointments, and call your doctor if you are having problems. It's also a good idea to know your test results and keep a list of the medicines you take. How can you care for yourself at home? ?Reduce stress  ? · Ask for help with cooking and housekeeping. ? · Figure out who or what causes your stress. Avoid these people or situations as much as possible. ? · Relax every day. Taking 10- to 15-minute breaks can make a big difference. Take a walk, listen to music, or take a warm bath. ? · Learn relaxation techniques at prenatal or yoga class. Or buy a relaxation tape. ? · List your fears about having a baby and becoming a parent. Share the list with someone you trust. Decide which worries are really small, and try to let them go. Exercise  ?  · If you did not exercise much before pregnancy, start slowly. Walking is best. Jamie Spina yourself, and do a little more every day. ? · Brisk walking, easy jogging, low-impact aerobics, water aerobics, and yoga are good choices. Some sports, such as scuba diving, horseback riding, downhill skiing, gymnastics, and water skiing, are not a good idea. ? · Try to do at least 2½ hours a week of moderate exercise, such as a fast walk. One way to do this is to be active 30 minutes a day, at least 5 days a week. It's fine to be active in blocks of 10 minutes or more throughout your day and week. ? · Wear loose clothing. And wear shoes and a bra that provide good support. ? · Warm up and cool down to start and finish your exercise. ? · If you want to use weights, be sure to use light weights. They reduce stress on your joints. ?Stay at the best weight for you  ? · Experts recommend that you gain about 1 pound a month during the first 3 months of your pregnancy. ? · Experts recommend that you gain about 1 pound a week during your last 6 months of pregnancy, for a total weight gain of 25 to 35 pounds. ? · If you are underweight, you will need to gain more weight (about 28 to 40 pounds). ? · If you are overweight, you may not need to gain as much weight (about 15 to 25 pounds). ? · If you are gaining weight too fast, use common sense. Exercise every day, and limit sweets, fast foods, and fats. Choose lean meats, fruits, and vegetables. ? · If you are having twins or more, your doctor may refer you to a dietitian. Where can you learn more? Go to http://mary-nuria.info/. Enter O158 in the search box to learn more about \"Weeks 14 to 18 of Your Pregnancy: Care Instructions. \"  Current as of: March 16, 2017  Content Version: 11.4  © 7353-3737 [employer]. Care instructions adapted under license by [employer] (which disclaims liability or warranty for this information).  If you have questions about a medical condition or this instruction, always ask your healthcare professional. [place] [address] any warranty or liability for your use of this information.

## 2018-02-08 NOTE — PROGRESS NOTES
[facility]  [email or web address]  [phone]    Mary Higuera MD, FACOG     Follow-up OB visit    Chief Complaint   Patient presents with    Routine Prenatal Visit       Vitals:    18 1433   BP: 114/72   Weight: 184 lb (83.5 kg)   Height: 5' 6\" (1.676 m)       Patient Active Problem List    Diagnosis Date Noted    10 weeks gestation of pregnancy 2017       The patient reports the following concerns: none. Patient declines genetic testing. Patient given information for 20 week ultrasound on 3/16/18 at the  center, and verbalized understanding. See PN flowsheet for exam    40 y.o.  16w0d   Encounter Diagnoses   Name Primary?  Elderly multigravida in first trimester Yes    10 weeks gestation of pregnancy          Pt now declines genetic screening/testing/afp     [] SAB/bleeding precautions reviewed   [] PTL/PPROM precautions reviewed   [] Labor precautions reviewed   [] Fetal kick counts discussed   [] Labs reviewed with patient   [] Towana [place] precautions reviewed   [] Consent reviewed   [] Handouts given to pt   [] Glucola handout    [] GBS/labor/Magic Hour handout   []    []    []    []    Follow-up Disposition:  Return in about 4 weeks (around 3/8/2018) for Follow up OB visit. No orders of the defined types were placed in this encounter.       Mary Higuera MD

## 2018-03-16 ENCOUNTER — HOSPITAL ENCOUNTER (OUTPATIENT)
Dept: PERINATAL CARE | Age: 38
Discharge: HOME | End: 2018-03-16
Attending: OBSTETRICS & GYNECOLOGY
Payer: COMMERCIAL

## 2018-03-16 ENCOUNTER — TELEPHONE (OUTPATIENT)
Dept: OBGYN CLINIC | Age: 38
End: 2018-03-16

## 2018-03-16 PROCEDURE — 76811 OB US DETAILED SNGL FETUS: CPT | Performed by: OBSTETRICS & GYNECOLOGY

## 2018-03-16 NOTE — TELEPHONE ENCOUNTER
LMTCB    Pt was scheduled at [facility] and missed Dr. Pearley Severance appointment at 10:50.  Patient can be seen at 1:10pm.

## 2018-04-09 ENCOUNTER — ROUTINE PRENATAL (OUTPATIENT)
Dept: OBGYN CLINIC | Age: 38
End: 2018-04-09

## 2018-04-09 VITALS
HEIGHT: 66 IN | BODY MASS INDEX: 31.18 KG/M2 | DIASTOLIC BLOOD PRESSURE: 70 MMHG | SYSTOLIC BLOOD PRESSURE: 120 MMHG | WEIGHT: 194 LBS

## 2018-04-09 DIAGNOSIS — Z3A.24 24 WEEKS GESTATION OF PREGNANCY: ICD-10-CM

## 2018-04-09 DIAGNOSIS — O09.521 ELDERLY MULTIGRAVIDA IN FIRST TRIMESTER: Primary | ICD-10-CM

## 2018-04-09 NOTE — MR AVS SNAPSHOT
[address] [place] [facility] [address] [place] [address] 
[phone] Patient: Adriana Deluca MRN: [id number] TRT:[id number] Visit Information Date & Time Provider Department Dept. Phone Encounter #  
 4/9/2018  2:20 PM Asya Young MD [facility] [phone] [id number] Upcoming Health Maintenance Date Due  
 PAP AKA CERVICAL CYTOLOGY 11/14/2019 Allergies as of 4/9/2018  Review Complete On: 4/9/2018 By: Ana Alexis LPN No Known Allergies Current Immunizations  Never Reviewed No immunizations on file. Not reviewed this visit Vitals BP Height(growth percentile) Weight(growth percentile) LMP BMI OB Status 120/70 5' 6\" (1.676 m) 194 lb (88 kg) 10/25/2017 (Exact Date) 31.31 kg/m2 Pregnant Smoking Status Never Smoker BMI and BSA Data Body Mass Index Body Surface Area  
 31.31 kg/m 2 2.02 m 2 Your Updated Medication List  
  
   
This list is accurate as of 4/9/18  2:44 PM.  Always use your most recent med list.  
  
  
  
  
 nitrofurantoin (macrocrystal-monohydrate) 100 mg capsule Commonly known as:  MACROBID  
TAKE 1 TABLET BY MOUTH TWICE A DAY phenazopyridine 200 mg tablet Commonly known as:  PYRIDIUM  
TAKE 1 TABLET BY MOUTH EVERY 8 HOURS AS NEEDED PRENATAL VITAMIN PO Take  by mouth. Patient Instructions Weeks 22 to 26 of Your Pregnancy: Care Instructions Your Care Instructions As you enter your 7th month of pregnancy at week 26, your baby's lungs are growing stronger and getting ready to breathe. You may notice that your baby responds to the sound of your or your partner's voice. You may also notice that your baby does less turning and twisting and more squirming or jerking. Jerking often means that your baby has the hiccups. Hiccups are perfectly normal and are only temporary. You may want to think about attending a childbirth preparation class. This is also a good time to start thinking about whether you want to have pain medicine during labor. Most pregnant women are tested for gestational diabetes between weeks 25 and 28. Gestational diabetes occurs when your blood sugar level gets too high when you're pregnant. The test is important, because you can have gestational diabetes and not know it. But the condition can cause problems for your baby. Follow-up care is a key part of your treatment and safety. Be sure to make and go to all appointments, and call your doctor if you are having problems. It's also a good idea to know your test results and keep a list of the medicines you take. How can you care for yourself at home? Ease discomfort from your baby's kicking · Change your position. Sometimes this will cause your baby to change position too. · Take a deep breath while you raise your arm over your head. Then breathe out while you drop your arm. Do Kegel exercises to prevent urine from leaking · You can do Kegel exercises while you stand or sit. ¨ Squeeze the same muscles you would use to stop your urine. Your belly and thighs should not move. ¨ Hold the squeeze for 3 seconds, and then relax for 3 seconds. ¨ Start with 3 seconds. Then add 1 second each week until you are able to squeeze for 10 seconds. ¨ Repeat the exercise 10 to 15 times for each session. Do three or more sessions each day. Ease or reduce swelling in your feet, ankles, hands, and fingers · If your fingers are puffy, take off your rings. · Do not eat high-salt foods, such as potato chips. · Prop up your feet on a stool or couch as much as possible. Sleep with pillows under your feet. · Do not stand for long periods of time or wear tight shoes. · Wear support stockings. Where can you learn more? Go to http://mary-nuria.info/. Enter G264 in the search box to learn more about \"Weeks 22 to 26 of Your Pregnancy: Care Instructions. \" Current as of: March 16, 2017 Content Version: 11.4 © 5154-3547 Healthwise, Incorporated. Care instructions adapted under license by [employer] (which disclaims liability or warranty for this information). If you have questions about a medical condition or this instruction, always ask your healthcare professional. [place] [address] any warranty or liability for your use of this information. Please provide this summary of care documentation to your next provider. Your primary care clinician is listed as Phys Other. If you have any questions after today's visit, please call [phone].

## 2018-04-09 NOTE — PATIENT INSTRUCTIONS
Weeks 22 to 26 of Your Pregnancy: Care Instructions  Your Care Instructions    As you enter your 7th month of pregnancy at week 26, your baby's lungs are growing stronger and getting ready to breathe. You may notice that your baby responds to the sound of your or your partner's voice. You may also notice that your baby does less turning and twisting and more squirming or jerking. Jerking often means that your baby has the hiccups. Hiccups are perfectly normal and are only temporary. You may want to think about attending a childbirth preparation class. This is also a good time to start thinking about whether you want to have pain medicine during labor. Most pregnant women are tested for gestational diabetes between weeks 25 and 28. Gestational diabetes occurs when your blood sugar level gets too high when you're pregnant. The test is important, because you can have gestational diabetes and not know it. But the condition can cause problems for your baby. Follow-up care is a key part of your treatment and safety. Be sure to make and go to all appointments, and call your doctor if you are having problems. It's also a good idea to know your test results and keep a list of the medicines you take. How can you care for yourself at home? Ease discomfort from your baby's kicking  · Change your position. Sometimes this will cause your baby to change position too. · Take a deep breath while you raise your arm over your head. Then breathe out while you drop your arm. Do Kegel exercises to prevent urine from leaking  · You can do Kegel exercises while you stand or sit. ¨ Squeeze the same muscles you would use to stop your urine. Your belly and thighs should not move. ¨ Hold the squeeze for 3 seconds, and then relax for 3 seconds. ¨ Start with 3 seconds. Then add 1 second each week until you are able to squeeze for 10 seconds. ¨ Repeat the exercise 10 to 15 times for each session.  Do three or more sessions each day.  Ease or reduce swelling in your feet, ankles, hands, and fingers  · If your fingers are puffy, take off your rings. · Do not eat high-salt foods, such as potato chips. · Prop up your feet on a stool or couch as much as possible. Sleep with pillows under your feet. · Do not stand for long periods of time or wear tight shoes. · Wear support stockings. Where can you learn more? Go to http://mary-nuria.info/. Enter G264 in the search box to learn more about \"Weeks 22 to 26 of Your Pregnancy: Care Instructions. \"  Current as of: March 16, 2017  Content Version: 11.4  © 8239-1900 Healthwise, Richard Pauer - 3P. Care instructions adapted under license by [employer] (which disclaims liability or warranty for this information). If you have questions about a medical condition or this instruction, always ask your healthcare professional. [place] [address] any warranty or liability for your use of this information.

## 2018-04-09 NOTE — PROGRESS NOTES
_ 164 [facility]  [email or web address]  [phone]    Agata Garcia MD, FACOG     Follow-up OB visit    Chief Complaint   Patient presents with    Routine Prenatal Visit       Vitals:    18 1443   BP: 120/70   Weight: 194 lb (88 kg)   Height: 5' 6\" (1.676 m)       Patient Active Problem List    Diagnosis Date Noted    Elderly multigravida in first trimester 2018    10 weeks gestation of pregnancy 2017       The patient reports the following concerns: none    See PN flowsheet for exam    40 y.o.  24w4d   Encounter Diagnosis   Name Primary?  Elderly multigravida in first trimester Yes        [] SAB/bleeding precautions reviewed   [] PTL/PPROM precautions reviewed   [] Labor precautions reviewed   [] Fetal kick counts discussed   [] Labs reviewed with patient   [] Florestine Stair precautions reviewed   [] Consent reviewed   [] Handouts given to pt   [] Glucola handout    [] GBS/labor/Magic Hour handout   []    []    []    []    Follow-up Disposition: Not on File    No orders of the defined types were placed in this encounter.       Agata Garcia MD

## 2018-04-30 ENCOUNTER — ROUTINE PRENATAL (OUTPATIENT)
Dept: OBGYN CLINIC | Age: 38
End: 2018-04-30

## 2018-04-30 VITALS — SYSTOLIC BLOOD PRESSURE: 114 MMHG | WEIGHT: 190 LBS | DIASTOLIC BLOOD PRESSURE: 70 MMHG | BODY MASS INDEX: 30.67 KG/M2

## 2018-04-30 DIAGNOSIS — Z34.90 ENCOUNTER FOR SUPERVISION OF NORMAL PREGNANCY, ANTEPARTUM, UNSPECIFIED GRAVIDITY: Primary | ICD-10-CM

## 2018-04-30 LAB
GTT, 1 HR, GLUCOLA, EXTERNAL: 142
HCT, EXTERNAL: 35.4
HGB, EXTERNAL: 11.8
PLATELET CNT,   EXTERNAL: 190

## 2018-04-30 NOTE — PROGRESS NOTES
_ 164 [facility]  [email or web address]  [phone]    Pricila Abebe MD, FACOG     Follow-up OB visit    Chief Complaint   Patient presents with    Routine Prenatal Visit       Vitals:    18 1429   BP: 114/70   Weight: 190 lb (86.2 kg)       Patient Active Problem List    Diagnosis Date Noted    Elderly multigravida in first trimester 2018    10 weeks gestation of pregnancy 2017       The patient reports the following concerns: none    See PN flowsheet for exam    40 y.o.  27w4d   Encounter Diagnosis   Name Primary?     Encounter for supervision of normal pregnancy, antepartum, unspecified  Yes     Consent reviewed: pt declined      [] SAB/bleeding precautions reviewed   [] PTL/PPROM precautions reviewed   [] Labor precautions reviewed   [] Fetal kick counts discussed   [] Labs reviewed with patient   [] Debe Massiel precautions reviewed   [] Consent reviewed   [] Handouts given to pt   [] Glucola handout    [] GBS/labor/Magic Hour handout   []    []    []    []    Follow-up Disposition: Not on File    Orders Placed This Encounter    GLUCOSE, GESTATIONAL 1 Heddie Rolling DIFF       Pricila Abebe MD

## 2018-04-30 NOTE — MR AVS SNAPSHOT
[address] [facility] 
[phone] Patient: Penny Phillips MRN: [id number] Visit Information Date & Time Provider Department Dept. Phone Encounter #  
 2018  2:30 PM MD Guillermo Alonso [phone] [id number]  
  
 2018  2:30 PM  
OB VISIT with MD Guillermo Alonso ([address]) Appt Note: 30wks TP  
 [address] [place] [address]  
[phone]  
  
   
 [address]  
  
    
 2018  2:30 PM  
OB VISIT with MD Guillermo Alonso ([facility]) Appt Note: 32wks TP  
 [address] 100[address] [facility]  
[phone] Upcoming Health Maintenance Date Due  
 OB 3RD TRIMESTER TDAP 2018 Influenza Age 5 to Adult 2018 PAP AKA CERVICAL CYTOLOGY 2019 Allergies as of 2018  Review Complete On: 2018 By: Spike Jimenez LPN No Known Allergies Current Immunizations  Never Reviewed No immunizations on file. Not reviewed this visit You Were Diagnosed With   
  
 Codes Comments Encounter for supervision of normal pregnancy, antepartum, unspecified     -  Primary ICD-10-CM: Z34.90 ICD-9-CM: V22.1 Vitals BP Weight(growth percentile) LMP BMI OB Status Smoking Status 114/70 (BP 1 Location: Left arm) 190 lb (86.2 kg) 10/25/2017 (Exact Date) 30.67 kg/m2 Pregnant Never Smoker BMI and BSA Data Body Mass Index Body Surface Area  
 30.67 kg/m 2 2 m 2 Your Updated Medication List  
  
   
This list is accurate as of 18  2:40 PM.  Always use your most recent med list.  
  
  
  
  
 PRENATAL VITAMIN PO Take  by mouth. We Performed the Following CBC WITH AUTOMATED DIFF [43488 CPT(R)] GLUCOSE, GESTATIONAL 1 HR TOLERANCE [34640 CPT(R)] Patient Instructions Weeks 26 to 30 of Your Pregnancy: Care Instructions Your Care Instructions You are now in your last trimester of pregnancy. Your baby is growing rapidly. And you'll probably feel your baby moving around more often. Your doctor may ask you to count your baby's kicks. Your back may ache as your body gets used to your baby's size and length. If you haven't already had the Tdap shot during this pregnancy, talk to your doctor about getting it. It will help protect your  against pertussis infection. During this time, it's important to take care of yourself and pay attention to what your body needs. If you feel sexual, explore ways to be close with your partner that match your comfort and desire. Use the tips provided in this care sheet to find ways to be sexual in your own way. Follow-up care is a key part of your treatment and safety. Be sure to make and go to all appointments, and call your doctor if you are having problems. It's also a good idea to know your test results and keep a list of the medicines you take. How can you care for yourself at home? Take it easy at work · Take frequent breaks. If possible, stop working when you are tired, and rest during your lunch hour. · Take bathroom breaks every 2 hours. · Change positions often. If you sit for long periods, stand up and walk around. · When you stand for a long time, keep one foot on a low stool with your knee bent. After standing a lot, sit with your feet up. · Avoid fumes, chemicals, and tobacco smoke. Be sexual in your own way · Having sex during pregnancy is okay, unless your doctor tells you not to. · You may be very interested in sex, or you may have no interest at all. · Your growing belly can make it hard to find a good position during intercourse. [place] and explore.  
· You may get cramps in your uterus when your partner touches your breasts. · A back rub may relieve the backache or cramps that sometimes follow orgasm. Learn about  labor · Watch for signs of  labor. You may be going into labor if: 
¨ You have menstrual-like cramps, with or without nausea. ¨ You have about 4 or more contractions in 20 minutes, or about 8 or more within 1 hour, even after you have had a glass of water and are resting. ¨ You have a low, dull backache that does not go away when you change your position. ¨ You have pain or pressure in your pelvis that comes and goes in a pattern. ¨ You have intestinal cramping or flu-like symptoms, with or without diarrhea. ¨ You notice an increase or change in your vaginal discharge. Discharge may be heavy, mucus-like, watery, or streaked with blood. ¨ Your water breaks. · If you think you have  labor: ¨ Drink 2 or 3 glasses of water or juice. Not drinking enough fluids can cause contractions. ¨ Stop what you are doing, and empty your bladder. Then lie down on your left side for at least 1 hour. ¨ While lying on your side, find your breast bone. Put your fingers in the soft spot just below it. Move your fingers down toward your belly button to find the top of your uterus. Check to see if it is tight. ¨ Contractions can be weak or strong. Record your contractions for an hour. Time a contraction from the start of one contraction to the start of the next one. ¨ Single or several strong contractions without a pattern are called Oneida-Kang contractions. They are practice contractions but not the start of labor. They often stop if you change what you are doing. ¨ Call your doctor if you have regular contractions. Where can you learn more? Go to http://mary-nuria.info/. Enter D200 in the search box to learn more about \"Weeks 26 to 30 of Your Pregnancy: Care Instructions. \" Current as of: 2017 Content Version: 11.4 © 2135-9253 Healthwise[employer]. Care instructions adapted under license by [employer] (which disclaims liability or warranty for this information). If you have questions about a medical condition or this instruction, always ask your healthcare professional. [address] any warranty or liability for your use of this information. Please provide this summary of care documentation to your next provider. Your primary care clinician is listed as Phys Other. If you have any questions after today's visit, please call [phone].

## 2018-04-30 NOTE — PATIENT INSTRUCTIONS
Weeks 26 to 30 of Your Pregnancy: Care Instructions  Your Care Instructions    You are now in your last trimester of pregnancy. Your baby is growing rapidly. And you'll probably feel your baby moving around more often. Your doctor may ask you to count your baby's kicks. Your back may ache as your body gets used to your baby's size and length. If you haven't already had the Tdap shot during this pregnancy, talk to your doctor about getting it. It will help protect your  against pertussis infection. During this time, it's important to take care of yourself and pay attention to what your body needs. If you feel sexual, explore ways to be close with your partner that match your comfort and desire. Use the tips provided in this care sheet to find ways to be sexual in your own way. Follow-up care is a key part of your treatment and safety. Be sure to make and go to all appointments, and call your doctor if you are having problems. It's also a good idea to know your test results and keep a list of the medicines you take. How can you care for yourself at home? Take it easy at work  · Take frequent breaks. If possible, stop working when you are tired, and rest during your lunch hour. · Take bathroom breaks every 2 hours. · Change positions often. If you sit for long periods, stand up and walk around. · When you stand for a long time, keep one foot on a low stool with your knee bent. After standing a lot, sit with your feet up. · Avoid fumes, chemicals, and tobacco smoke. Be sexual in your own way  · Having sex during pregnancy is okay, unless your doctor tells you not to. · You may be very interested in sex, or you may have no interest at all. · Your growing belly can make it hard to find a good position during intercourse. [place] and explore. · You may get cramps in your uterus when your partner touches your breasts.   · A back rub may relieve the backache or cramps that sometimes follow orgasm. Learn about  labor  · Watch for signs of  labor. You may be going into labor if:  ¨ You have menstrual-like cramps, with or without nausea. ¨ You have about 4 or more contractions in 20 minutes, or about 8 or more within 1 hour, even after you have had a glass of water and are resting. ¨ You have a low, dull backache that does not go away when you change your position. ¨ You have pain or pressure in your pelvis that comes and goes in a pattern. ¨ You have intestinal cramping or flu-like symptoms, with or without diarrhea. ¨ You notice an increase or change in your vaginal discharge. Discharge may be heavy, mucus-like, watery, or streaked with blood. ¨ Your water breaks. · If you think you have  labor:  ¨ Drink 2 or 3 glasses of water or juice. Not drinking enough fluids can cause contractions. ¨ Stop what you are doing, and empty your bladder. Then lie down on your left side for at least 1 hour. ¨ While lying on your side, find your breast bone. Put your fingers in the soft spot just below it. Move your fingers down toward your belly button to find the top of your uterus. Check to see if it is tight. ¨ Contractions can be weak or strong. Record your contractions for an hour. Time a contraction from the start of one contraction to the start of the next one. ¨ Single or several strong contractions without a pattern are called Wenceslao-Kang contractions. They are practice contractions but not the start of labor. They often stop if you change what you are doing. ¨ Call your doctor if you have regular contractions. Where can you learn more? Go to http://mary-nuria.info/. Enter J498 in the search box to learn more about \"Weeks 26 to 30 of Your Pregnancy: Care Instructions. \"  Current as of: 2017  Content Version: 11.4  © 9140-6816 [employer]  Care instructions adapted under license by [employer] (which disclaims liability or warranty for this information). If you have questions about a medical condition or this instruction, always ask your healthcare professional. [place] [address] any warranty or liability for your use of this information.

## 2018-05-01 LAB
BASOPHILS # BLD AUTO: 0 X10E3/UL (ref 0–0.2)
BASOPHILS NFR BLD AUTO: 0 %
EOSINOPHIL # BLD AUTO: 0.3 X10E3/UL (ref 0–0.4)
EOSINOPHIL NFR BLD AUTO: 4 %
ERYTHROCYTE [DISTWIDTH] IN BLOOD BY AUTOMATED COUNT: 13.9 % (ref 12.3–15.4)
GLUCOSE 1H P 50 G GLC PO SERPL-MCNC: 142 MG/DL (ref 65–139)
GTT 120 MIN, EXTERNAL: 194
GTT 180 MIN, EXTERNAL: 134
GTT 60 MIN, EXTERNAL: 209
GTT, FASTING, EXTERNAL: 69
HCT VFR BLD AUTO: 35.4 % (ref 34–46.6)
HGB BLD-MCNC: 11.8 G/DL (ref 11.1–15.9)
IMM GRANULOCYTES # BLD: 0.1 X10E3/UL (ref 0–0.1)
IMM GRANULOCYTES NFR BLD: 1 %
LYMPHOCYTES # BLD AUTO: 1.8 X10E3/UL (ref 0.7–3.1)
LYMPHOCYTES NFR BLD AUTO: 18 %
MCH RBC QN AUTO: 29.1 PG (ref 26.6–33)
MCHC RBC AUTO-ENTMCNC: 33.3 G/DL (ref 31.5–35.7)
MCV RBC AUTO: 87 FL (ref 79–97)
MONOCYTES # BLD AUTO: 0.6 X10E3/UL (ref 0.1–0.9)
MONOCYTES NFR BLD AUTO: 6 %
NEUTROPHILS # BLD AUTO: 7.1 X10E3/UL (ref 1.4–7)
NEUTROPHILS NFR BLD AUTO: 71 %
PLATELET # BLD AUTO: 190 X10E3/UL (ref 150–379)
RBC # BLD AUTO: 4.05 X10E6/UL (ref 3.77–5.28)
WBC # BLD AUTO: 9.8 X10E3/UL (ref 3.4–10.8)

## 2018-05-02 DIAGNOSIS — Z3A.10 10 WEEKS GESTATION OF PREGNANCY: ICD-10-CM

## 2018-05-02 LAB
HCT, EXTERNAL: NORMAL
HGB, EXTERNAL: NORMAL
PLATELET CNT,   EXTERNAL: NORMAL

## 2018-05-02 NOTE — PROGRESS NOTES
Recorded labs. Added to problem list. Left message for patient to call office in reference to lab results, and Drs recommendations to schedule 3 hr glucola.

## 2018-05-02 NOTE — PROGRESS NOTES
[employer]ars  # [phone] assisted this nurse in advising patient of MD reviewed lab and recommendations. Patient advised of need to have nothing by mouth prior to her 3 hour testing and that she would need to be here at the office for the full 3 hours. Patient verbalized understanding and repeated back instructions through . Patient placed on the schedule for 8:30AM on 5/4/18. Patient verbalized understanding.

## 2018-05-04 ENCOUNTER — LAB ONLY (OUTPATIENT)
Dept: OBGYN CLINIC | Age: 38
End: 2018-05-04

## 2018-05-04 DIAGNOSIS — O09.521 ELDERLY MULTIGRAVIDA IN FIRST TRIMESTER: Primary | ICD-10-CM

## 2018-05-04 DIAGNOSIS — O99.810 ABNORMAL GLUCOSE TOLERANCE TEST (GTT) DURING PREGNANCY, ANTEPARTUM: ICD-10-CM

## 2018-05-05 LAB
GLUCOSE 1H P 100 G GLC PO SERPL-MCNC: 209 MG/DL (ref 65–179)
GLUCOSE 2H P 100 G GLC PO SERPL-MCNC: 194 MG/DL (ref 65–154)
GLUCOSE 3H P 100 G GLC PO SERPL-MCNC: 134 MG/DL (ref 65–139)
GLUCOSE P FAST SERPL-MCNC: 69 MG/DL (ref 65–94)
NOTE:, 102047: ABNORMAL

## 2018-05-11 ENCOUNTER — HOSPITAL ENCOUNTER (OUTPATIENT)
Dept: PERINATAL CARE | Age: 38
Discharge: HOME | End: 2018-05-11
Attending: OBSTETRICS & GYNECOLOGY
Payer: COMMERCIAL

## 2018-05-11 ENCOUNTER — TELEPHONE (OUTPATIENT)
Dept: OBGYN CLINIC | Age: 38
End: 2018-05-11

## 2018-05-11 DIAGNOSIS — Z3A.10 10 WEEKS GESTATION OF PREGNANCY: ICD-10-CM

## 2018-05-11 PROCEDURE — 76816 OB US FOLLOW-UP PER FETUS: CPT | Performed by: OBSTETRICS & GYNECOLOGY

## 2018-05-11 PROCEDURE — 76817 TRANSVAGINAL US OBSTETRIC: CPT | Performed by: OBSTETRICS & GYNECOLOGY

## 2018-05-11 NOTE — PROGRESS NOTES
LMTCB  Pt has appt scheduled with Dr. Matthew Gonzalez at [address] on 6/18/18 at 10:00am. See telephone encounter for details. Pt has appt with MFM today 5/11/18, and they were notified of GDM diagnosis, order faxed to schedule appt @ 32 weeks. Request sent to [facility] to manage GDM until endo appt. PNL/PL updated.

## 2018-05-11 NOTE — TELEPHONE ENCOUNTER
[facility]    Patient has appointment scheduled with [facility], Dr. Jimmie Mead on Monday 6/18/18 at 10:00am, with an arrival time of 9:45am.  This appointment is scheduled at the [address] office located:   [address] [facility], [address] [facility] [address]  [place], [address] [facility]    Pt needs to bring her insurance card and photo ID with her. Please advise patient she will be receiving a call from the diabetes treatment center to schedule an appointment to manage her GDM, until her Endo appt. MFM was also notified, and she will continue to see them for the remainder of her pregnancy. I have added the patient to the practice's cancellation list, and faxed over records ATTN to Dr. Jimmie Mead per [address] request to get her in to a sooner appointment.     ----- Message from Andrea Abraham MD sent at 5/11/2018  3:36 AM EDT -----  Needs endo ASAP and MFM FU For GDM at 32 wks  Pls notify patient   Update chart.

## 2018-05-11 NOTE — TELEPHONE ENCOUNTER
Patient is calling because she missed a phone call regarding test results. Notes Recorded by Beena Castellanos LPN on 5/73/2522 at 02:09 AM  LMTCB  Pt has appt scheduled with Dr. Hakeem Brewer at [address] on 6/18/18 at 10:00am. See telephone encounter for details. Pt has appt with MFM today 5/11/18, and they were notified of GDM diagnosis, order faxed to schedule appt @ 32 weeks. Request sent to [facility] to manage GDM until endo appt. PNL/PL updated. ------    Notes Recorded by Melissa Velazquez MD on 5/11/2018 at 3:36 AM  Needs endo ASAP and MFM FU For GDM at 32 wks  Pls notify patient     Patient was told per ML that Endo and [facility] will be calling to get her scheduled. The appt that she has at [address] is too far out and she needs to be seen sooner. Patient thinks she has a MFM appt June 11, 2018.

## 2018-05-13 NOTE — TELEPHONE ENCOUNTER
Agree with [facility] asap and have MFM manage with a q 1 week MFM follow up until can see Endo: if needed. Send referral to MFM please , to manage GDM for now.      TRP

## 2018-05-14 NOTE — PROGRESS NOTES
Abnormal results. Please notify pt. C/W GDM  Notify pt. Update OB Problem List: GDM  Update PNL  Refer to endocrine ASAP. Refer to [facility] if needs to go to MFM for management of GDM or if >1wk until endocrine appt. available  Refer to MFM for fetal monitoring for GDM at 32-34 wks or to manage GDM if endo not readily available (within 1-2 wks)  29w3d

## 2018-05-17 ENCOUNTER — HOSPITAL ENCOUNTER (OUTPATIENT)
Dept: DIABETES SERVICES | Age: 38
Discharge: HOME | End: 2018-05-17
Payer: COMMERCIAL

## 2018-05-17 DIAGNOSIS — O24.419 GESTATIONAL DIABETES MELLITUS (GDM), ANTEPARTUM, GESTATIONAL DIABETES METHOD OF CONTROL UNSPECIFIED: ICD-10-CM

## 2018-05-17 PROCEDURE — G0108 DIAB MANAGE TRN  PER INDIV: HCPCS | Performed by: DIETITIAN, REGISTERED

## 2018-05-17 NOTE — DIABETES MGMT
5/17/2018    Dear Dileep Becker MD,    Thank you for your kind referral. Your patient, Ashley Lara, attended an gestational diabetes education session at [address] [place] [address] where the following topics were covered today:  *Describing diabetes disease process and treatment options   *Incorporating nutrition management into lifestyle   *Monitoring blood glucose and other parameters and interpreting and using the results for self   management decision making   *Preventing, detecting and treating acute complications   * Incorporating physical activity into lifestyle   * Using medication(s) safely and for maximum therapeutic benefit   * Develop personal strategies to promote health and behavior change  * States relationship of blood glucose control in pregnancy and outcomes for mother and baby    Data from this visit:    Weight:5/17/2018 187.4 #     Goal 1: Follow monitoring schedule - Test my blood sugar 4 times a day and keep record. Comments:  Met with pt today for intial GDM visit. Pt primary language is [place] but does understand English, offered to use language line but pt declined. Pt understands best if information described without using medical jargon and encouraged pt to let educator know if she wishes to use the language line. Pt very tearful during visit and stated that she is very worried about the health of her baby. Pt has become very restrictive with her diet in an effort to control her BG. Per OB progress note in [facility] on 4/30/18 pt weight was 190# and at todays visit 187.4#.  Reviewed pathophysiology of GDM. Spent majority of time discussing carb counting and meal planning to help ease pts fear around food. Discussed which foods contain carbohydrates as well as proteins and fat. Pt typically consumes rice with lunch and dinner meals but after diagnosis eliminated all rice and most carbs.  Pt wants to resume eating foods that she normally would for her culture but was worried it would raise BG too much. Provided pt with gestational diet and discussed aiming for 30gm carb at breakfast and 45gm for lunch and dinner. Also discussed choosing brown rice over white rice for improved glycemic control. Pt has begun walking most days of the week for at least 30 minutes. Pt provided with OneTouch Verio meter from endo office and she is currently checking BG 4x/day and keeping log. Your patient will have a follow up appointment in one week. Their next visit is scheduled for 5/31/18. We look forward to assisting your patient in meeting their self-management goals. If you have any questions, please do not hesitate to call the [facility] at [phone].     Sincerely, Marisa Beckman RD, [address] [facility]  [address]  Phone: [phone] Fax: [phone]

## 2018-05-21 ENCOUNTER — ROUTINE PRENATAL (OUTPATIENT)
Dept: OBGYN CLINIC | Age: 38
End: 2018-05-21

## 2018-05-21 VITALS
DIASTOLIC BLOOD PRESSURE: 72 MMHG | BODY MASS INDEX: 29.73 KG/M2 | WEIGHT: 185 LBS | SYSTOLIC BLOOD PRESSURE: 110 MMHG | HEIGHT: 66 IN

## 2018-05-21 DIAGNOSIS — O24.410 DIET CONTROLLED GESTATIONAL DIABETES MELLITUS (GDM) IN THIRD TRIMESTER: ICD-10-CM

## 2018-05-21 DIAGNOSIS — O09.521 ELDERLY MULTIGRAVIDA IN FIRST TRIMESTER: Primary | ICD-10-CM

## 2018-05-21 DIAGNOSIS — Z3A.30 30 WEEKS GESTATION OF PREGNANCY: ICD-10-CM

## 2018-05-21 DIAGNOSIS — Z23 ENCOUNTER FOR IMMUNIZATION: ICD-10-CM

## 2018-05-21 NOTE — MR AVS SNAPSHOT
[address] 1007 [facility] 
[phone] Patient: Fadumo Snowden MRN: [id number] SXX:4[birth year]0/2452 Visit Information Date & Time Provider Department Dept. Phone Encounter #  
 5/21/2018  2:30 PM MD Guillermo Taveras [phone] [id number]  
  
 6/6/2018  2:30 PM  
OB VISIT with MD Guillermo Taveras ([facility]) Appt Note: 32wks TP  
 [address] [place] [address]  
[address] 100[address] [facility] Upcoming Health Maintenance Date Due  
 OB 3RD TRIMESTER TDAP 4/26/2018 Influenza Age 5 to Adult 8/1/2018 PAP AKA CERVICAL CYTOLOGY 11/14/2019 Allergies as of 5/21/2018  Review Complete On: 5/21/2018 By: Mari Colbert LPN No Known Allergies Current Immunizations  Never Reviewed No immunizations on file. Not reviewed this visit Vitals BP Height(growth percentile) Weight(growth percentile) LMP BMI OB Status 110/72 5' 6\" (1.676 m) 185 lb (83.9 kg) 10/25/2017 (Exact Date) 29.86 kg/m2 Pregnant Smoking Status Never Smoker BMI and BSA Data Body Mass Index Body Surface Area  
 29.86 kg/m 2 1.98 m 2 Your Updated Medication List  
  
   
This list is accurate as of 5/21/18  2:51 PM.  Always use your most recent med list.  
  
  
  
  
 PRENATAL VITAMIN PO Take  by mouth. To-Do List   
 05/31/2018 4:00 PM  
  Appointment with Yonathan Bray RD at [address] ([phone]) Patient Instructions Weeks 30 to 32 of Your Pregnancy: Care Instructions Your Care Instructions You have made it to the final months of your pregnancy. By now, your baby is really starting to look like a baby, with hair and plump skin.  
As you enter the final weeks of pregnancy, the reality of having a baby may start to set in. This is the time to settle on a name, get your household in order, set up a safe nursery, and find quality  if needed. Doing these things in advance will allow you to focus on caring for and enjoying your new baby. You may also want to have a tour of your hospital's labor and delivery unit to get a better idea of what to expect while you are in the hospital. 
During these last months, it is very important to take good care of yourself and pay attention to what your body needs. If your doctor says it is okay for you to work, don't push yourself too hard. Use the tips provided in this care sheet to ease heartburn and care for varicose veins. If you haven't already had the Tdap shot during this pregnancy, talk to your doctor about getting it. It will help protect your  against pertussis infection. Follow-up care is a key part of your treatment and safety. Be sure to make and go to all appointments, and call your doctor if you are having problems. It's also a good idea to know your test results and keep a list of the medicines you take. How can you care for yourself at home? Pay attention to your baby's movements · You should feel your baby move several times every day. · Your baby now turns less, and kicks and jabs more. · Your baby sleeps 20 to 45 minutes at a time and is more active at certain times of day. · If your doctor wants you to count your baby's kicks: 
¨ Empty your bladder, and lie on your side or relax in a comfortable chair. ¨ Write down your start time. ¨ Pay attention only to your baby's movements. Count any movement except hiccups. ¨ After you have counted 10 movements, write down your stop time. ¨ Write down how many minutes it took for your baby to move 10 times. ¨ If an hour goes by and you have not recorded 10 movements, have something to eat or drink and then count for another hour.  If you do not record 10 movements in either hour, call your doctor. Ease heartburn · Eat small, frequent meals. · Do not eat chocolate, peppermint, or very spicy foods. Avoid drinks with caffeine, such as coffee, tea, and sodas. · Avoid bending over or lying down after meals. · Talk a short walk after you eat. · If heartburn is a problem at night, do not eat for 2 hours before bedtime. · Take antacids like Mylanta, Maalox, Rolaids, or Tums. Do not take antacids that have sodium bicarbonate. Care for varicose veins · Varicose veins are blood vessels that stretch out with the extra blood during pregnancy. Your legs may ache or throb. Most varicose veins will go away after the birth. · Avoid standing for long periods of time. Sit with your legs crossed at the ankles, not the knees. · Sit with your feet propped up. · Avoid tight clothing or stockings. Wear support hose. · Exercise regularly. Try walking for at least 30 minutes a day. Where can you learn more? Go to http://mary-nuria.info/. Enter C279 in the search box to learn more about \"Weeks 30 to 32 of Your Pregnancy: Care Instructions. \" Current as of: March 16, 2017 Content Version: 11.4 © 0573-8874 [employer]. Care instructions adapted under license by [employer] (which disclaims liability or warranty for this information). If you have questions about a medical condition or this instruction, always ask your healthcare professional. [place] [address] any warranty or liability for your use of this information. Please provide this summary of care documentation to your next provider. Your primary care clinician is listed as Phys Other. If you have any questions after today's visit, please call [phone].

## 2018-05-21 NOTE — PATIENT INSTRUCTIONS
Weeks 30 to 32 of Your Pregnancy: Care Instructions  Your Care Instructions    You have made it to the final months of your pregnancy. By now, your baby is really starting to look like a baby, with hair and plump skin. As you enter the final weeks of pregnancy, the reality of having a baby may start to set in. This is the time to settle on a name, get your household in order, set up a safe nursery, and find quality  if needed. Doing these things in advance will allow you to focus on caring for and enjoying your new baby. You may also want to have a tour of your hospital's labor and delivery unit to get a better idea of what to expect while you are in the hospital.  During these last months, it is very important to take good care of yourself and pay attention to what your body needs. If your doctor says it is okay for you to work, don't push yourself too hard. Use the tips provided in this care sheet to ease heartburn and care for varicose veins. If you haven't already had the Tdap shot during this pregnancy, talk to your doctor about getting it. It will help protect your  against pertussis infection. Follow-up care is a key part of your treatment and safety. Be sure to make and go to all appointments, and call your doctor if you are having problems. It's also a good idea to know your test results and keep a list of the medicines you take. How can you care for yourself at home? Pay attention to your baby's movements  · You should feel your baby move several times every day. · Your baby now turns less, and kicks and jabs more. · Your baby sleeps 20 to 45 minutes at a time and is more active at certain times of day. · If your doctor wants you to count your baby's kicks:  ¨ Empty your bladder, and lie on your side or relax in a comfortable chair. ¨ Write down your start time. ¨ Pay attention only to your baby's movements. Count any movement except hiccups.   ¨ After you have counted 10 movements, write down your stop time. ¨ Write down how many minutes it took for your baby to move 10 times. ¨ If an hour goes by and you have not recorded 10 movements, have something to eat or drink and then count for another hour. If you do not record 10 movements in either hour, call your doctor. Ease heartburn  · Eat small, frequent meals. · Do not eat chocolate, peppermint, or very spicy foods. Avoid drinks with caffeine, such as coffee, tea, and sodas. · Avoid bending over or lying down after meals. · Talk a short walk after you eat. · If heartburn is a problem at night, do not eat for 2 hours before bedtime. · Take antacids like Mylanta, Maalox, Rolaids, or Tums. Do not take antacids that have sodium bicarbonate. Care for varicose veins  · Varicose veins are blood vessels that stretch out with the extra blood during pregnancy. Your legs may ache or throb. Most varicose veins will go away after the birth. · Avoid standing for long periods of time. Sit with your legs crossed at the ankles, not the knees. · Sit with your feet propped up. · Avoid tight clothing or stockings. Wear support hose. · Exercise regularly. Try walking for at least 30 minutes a day. Where can you learn more? Go to http://mary-nuria.info/. Enter G605 in the search box to learn more about \"Weeks 30 to 32 of Your Pregnancy: Care Instructions. \"  Current as of: March 16, 2017  Content Version: 11.4  © 4240-1093 [employer]. Care instructions adapted under license by [employer] (which disclaims liability or warranty for this information). If you have questions about a medical condition or this instruction, always ask your healthcare professional. [place] [address] any warranty or liability for your use of this information.

## 2018-05-21 NOTE — PROGRESS NOTES
Patient received the Tdap vaccine in left deltoid without complications, per MD order. Consent signed.

## 2018-05-31 ENCOUNTER — HOSPITAL ENCOUNTER (OUTPATIENT)
Dept: DIABETES SERVICES | Age: 38
Discharge: HOME | End: 2018-05-31
Payer: COMMERCIAL

## 2018-05-31 DIAGNOSIS — O24.419 GESTATIONAL DIABETES MELLITUS (GDM), ANTEPARTUM, GESTATIONAL DIABETES METHOD OF CONTROL UNSPECIFIED: ICD-10-CM

## 2018-05-31 PROCEDURE — G0108 DIAB MANAGE TRN  PER INDIV: HCPCS | Performed by: DIETITIAN, REGISTERED

## 2018-05-31 NOTE — DIABETES MGMT
05/31/18    Thank you for your kind referral. Your patient, Aneesh Griffiths attended a follow up gestational diabetes education session at [address] [place] [address] where the following topics were covered today:    *Incorporating nutrition management into lifestyle   *Monitoring blood glucose and other parameters and interpreting and using the results  for self-management decision making   * Incorporating physical activity into lifestyle     Data from this visit:   Weight: 5/17/2018 187.4 #, 5/31/2018 182 # 5.4# loss in two weeks    Pt achieved her goal set at first session: Goal 1: Follow monitoring schedule - Test my blood sugar 4 times a day and keep record. Comments: Her endocrinologist has let her decrease her testing to twice a day since all WNL but she also significantly reduced carbs to the point she is not getting adequate amounts to support the pregnancy. Reviewed meal planning with carb goals and her usual meal times. She agreed to add carbs back to diet. She had one low FBS of 54 mg/dl this week. Treated with candy. She was shaky      Your patient has been encouraged to follow up with the [place] [address] staff by phone or in the office 6-12 weeks post partum so we can assess weight loss, meal planning skills and activity levels post partum to help prevent Type 2 Diabetes in the future. We look forward to assisting your patient in meeting their self-management goals.  If you have any questions, please do not hesitate to call the [facility] at [phone]    Sincerely, Paul Renteria, [address] [place] [address], [address] [place] [address] [facility]  Phone: [phone] Fax: [phone]

## 2018-06-06 ENCOUNTER — ROUTINE PRENATAL (OUTPATIENT)
Dept: OBGYN CLINIC | Age: 38
End: 2018-06-06

## 2018-06-06 VITALS — DIASTOLIC BLOOD PRESSURE: 74 MMHG | WEIGHT: 182 LBS | BODY MASS INDEX: 29.38 KG/M2 | SYSTOLIC BLOOD PRESSURE: 114 MMHG

## 2018-06-06 DIAGNOSIS — O24.410 DIET CONTROLLED GESTATIONAL DIABETES MELLITUS (GDM) IN THIRD TRIMESTER: ICD-10-CM

## 2018-06-06 DIAGNOSIS — O09.521 ELDERLY MULTIGRAVIDA IN FIRST TRIMESTER: Primary | ICD-10-CM

## 2018-06-06 PROBLEM — Z3A.10 10 WEEKS GESTATION OF PREGNANCY: Status: RESOLVED | Noted: 2017-12-21 | Resolved: 2018-06-06

## 2018-06-06 NOTE — MR AVS SNAPSHOT
[address] [facility] [address] 100[address] [facility] 
[phone] Patient: Matthew Aceves MRN: [id number] YPL:[birth year] Visit Information Date & Time Provider Department Dept. Phone Encounter #  
 6/6/2018  2:30 PM Joselyn Kussmaul, MD [place] [phone] [id number]  
  
 6/18/2018  3:10 PM  
OB VISIT with Joselyn Kussmaul, MD [place] ([facility]) Appt Note: 2wk fob  34wk  TP  
 [address] [facility] [address] 1007 [facility]  
[phone]  
  
   
 [address] [address]  
  
    
 7/2/2018  1:30 PM  
OB VISIT with Joselyn Kussmaul, MD [place] ([facility]) Appt Note: 2wk fob   36wk  TP  
 [address] [facility] [address] [place] [address]  
[phone] Upcoming Health Maintenance Date Due Influenza Age 5 to Adult 8/1/2018 PAP AKA CERVICAL CYTOLOGY 11/14/2019 Allergies as of 6/6/2018  Review Complete On: 6/6/2018 By: Allison Pino No Known Allergies Current Immunizations  Never Reviewed Name Date Tdap 5/21/2018 Not reviewed this visit Vitals BP Weight(growth percentile) LMP BMI OB Status Smoking Status 114/74 182 lb (82.6 kg) 10/25/2017 (Exact Date) 29.38 kg/m2 Pregnant Never Smoker Vitals History BMI and BSA Data Body Mass Index Body Surface Area  
 29.38 kg/m 2 1.96 m 2 Your Updated Medication List  
  
   
This list is accurate as of 6/6/18  2:55 PM.  Always use your most recent med list.  
  
  
  
  
 PRENATAL VITAMIN PO Take  by mouth. To-Do List   
 06/11/2018 1:30 PM  
  Appointment with ULTRASOUND 1 SFM at [facility] ([phone]) Patient Instructions Weeks 32 to 34 of Your Pregnancy: Care Instructions Your Care Instructions During the last few weeks of your pregnancy, you may have more aches and pains. It's important to rest when you can. Your growing baby is putting more pressure on your bladder. So you may need to urinate more often. Hemorrhoids are also common. These are painful, itchy veins in the rectal area. In the 36th week, most women have a test for group B streptococcus (GBS). GBS is a common bacteria that can live in the vagina and rectum. It can make your baby sick after birth. If you test positive, you will get antibiotics during labor. These will keep your baby from getting the bacteria. You may want to talk with your doctor about banking your baby's umbilical cord blood. This is the blood left in the cord after birth. If you want to save this blood, you must arrange it ahead of time. You can't decide at the last minute. If you haven't already had the Tdap shot during this pregnancy, talk to your doctor about getting it. It will help protect your  against pertussis infection. Follow-up care is a key part of your treatment and safety. Be sure to make and go to all appointments, and call your doctor if you are having problems. It's also a good idea to know your test results and keep a list of the medicines you take. How can you care for yourself at home? Ease hemorrhoids · Get more liquids, fruits, vegetables, and fiber in your diet. This will help keep your stools soft. · Avoid sitting for too long. Lie on your left side several times a day. · Clean yourself with soft, moist toilet paper. Or you can use witch hazel pads or personal hygiene pads. · If you are uncomfortable, try ice packs. Or you can sit in a warm sitz bath. Do these for 20 minutes at a time, as needed. · Use hydrocortisone cream for pain and itching. Two examples are Anusol and Preparation H Hydrocortisone. · Ask your doctor about taking an over-the-counter stool softener. Consider breastfeeding · Experts recommend that women breastfeed for 1 year or longer. Breast milk is the perfect food for babies. · Breast milk is easier for babies to digest than formula. And it is always available, just the right temperature, and free. · In general, babies who are  are healthier than formula-fed babies. ¨  babies are less likely to get ear infections, colds, diarrhea, and pneumonia. ¨  babies who are fed only breast milk are less likely to get asthma and allergies. ¨  babies are less likely to be obese. ¨  babies are less likely to get diabetes or heart disease. · Women who breastfeed have less bleeding after the birth. Their uteruses also shrink back faster. · Some women who breastfeed lose weight faster. Making milk burns calories. · Breastfeeding can lower your risk of breast cancer, ovarian cancer, and osteoporosis. Decide about circumcision for boys · As you make this decision, it may help to think about your personal, Buddhist, and family traditions. You get to decide if you will keep your son's penis natural or if he will be circumcised. · If you decide that you would like to have your baby circumcised, talk with your doctor. You can share your concerns about pain. And you can discuss your preferences for anesthesia. Where can you learn more? Go to http://mary-nuria.info/. Enter M379 in the search box to learn more about \"Weeks 32 to 34 of Your Pregnancy: Care Instructions. \" Current as of: March 16, 2017 Content Version: 11.4 © 0498-1415 Healthwise, Incorporated. Care instructions adapted under license by [employer] (which disclaims liability or warranty for this information). If you have questions about a medical condition or this instruction, always ask your healthcare professional. [place] [address] any warranty or liability for your use of this information. Please provide this summary of care documentation to your next provider. Your primary care clinician is listed as Phys Other. If you have any questions after today's visit, please call [phone].

## 2018-06-06 NOTE — PROGRESS NOTES
[facility]  [email or web address]  [phone]    All Puente MD, FACOG     Follow-up OB visit    Chief Complaint   Patient presents with    Routine Prenatal Visit       Vitals:    18 1446   BP: 114/74   Weight: 182 lb (82.6 kg)       Patient Active Problem List    Diagnosis Date Noted    Elderly multigravida in first trimester 2018       The patient reports the following concerns: none    See PN flowsheet for exam    41 y.o.  32w6d   Encounter Diagnoses   Name Primary?  Elderly multigravida in first trimester Yes    10 weeks gestation of pregnancy      Disc safer meds in preg for URTI sx: h/o given  Disc healthy diet in pregnancy  Will monitor wt changes, inc protein rich snacks  Keep mfm/DTC fu     [] SAB/bleeding precautions reviewed   [x] PTL/PPROM precautions reviewed   [] Labor precautions reviewed   [] Fetal kick counts discussed   [] Labs reviewed with patient   [] Butler Minder precautions reviewed   [] Consent reviewed   [] Handouts given to pt   [] Glucola handout    [] GBS/labor/Magic Hour handout   []    []    []    []    Follow-up Disposition: Not on File    No orders of the defined types were placed in this encounter.       All Puente MD

## 2018-06-06 NOTE — PATIENT INSTRUCTIONS

## 2018-06-11 ENCOUNTER — HOSPITAL ENCOUNTER (OUTPATIENT)
Dept: PERINATAL CARE | Age: 38
Discharge: HOME | End: 2018-06-11
Attending: OBSTETRICS & GYNECOLOGY
Payer: COMMERCIAL

## 2018-06-11 PROCEDURE — 76816 OB US FOLLOW-UP PER FETUS: CPT | Performed by: OBSTETRICS & GYNECOLOGY

## 2018-06-18 ENCOUNTER — ROUTINE PRENATAL (OUTPATIENT)
Dept: OBGYN CLINIC | Age: 38
End: 2018-06-18

## 2018-06-18 VITALS — WEIGHT: 180 LBS | SYSTOLIC BLOOD PRESSURE: 112 MMHG | DIASTOLIC BLOOD PRESSURE: 78 MMHG | BODY MASS INDEX: 29.05 KG/M2

## 2018-06-18 DIAGNOSIS — O09.521 ELDERLY MULTIGRAVIDA IN FIRST TRIMESTER: Primary | ICD-10-CM

## 2018-06-18 NOTE — MR AVS SNAPSHOT
[address] [facility] [address] 
[phone] Patient: Bhavin Christiansen MRN: [id number] IKX:[birth year] Visit Information Date & Time Provider Department Dept. Phone Encounter #  
 6/18/2018  3:10 PM MD Guillermo Foster [phone] Your Appointments 8/1/2018  7:00 AM  
PROCEDURE with MD Guillermo Foster ([facility]) Appt Note: Induction [address]  
[phone]  
  
   
 [address] [address]  
  
    
  
 7/2/2018  1:30 PM  
OB VISIT with MD Guillermo Foster ([facility]) Appt Note: 2wk fob   36wk  TP  
 [address] [facility] [address] [address]  
[address] [place] [address] [address] Upcoming Health Maintenance Date Due Influenza Age 5 to Adult 8/1/2018 PAP AKA CERVICAL CYTOLOGY 11/14/2019 Allergies as of 6/18/2018  Review Complete On: 6/18/2018 By: Raz Avina No Known Allergies Current Immunizations  Never Reviewed Name Date Tdap 5/21/2018 Not reviewed this visit Vitals BP Weight(growth percentile) LMP BMI OB Status Smoking Status 112/78 180 lb (81.6 kg) 10/25/2017 (Exact Date) 29.05 kg/m2 Pregnant Never Smoker BMI and BSA Data Body Mass Index Body Surface Area 29.05 kg/m 2 1.95 m 2 Your Updated Medication List  
  
   
This list is accurate as of 6/18/18  3:38 PM.  Always use your most recent med list.  
  
  
  
  
 PRENATAL VITAMIN PO Take  by mouth. Please provide this summary of care documentation to your next provider. Your primary care clinician is listed as Phys Other. If you have any questions after today's visit, please call [phone].

## 2018-06-18 NOTE — PROGRESS NOTES
[facility]  [email or web address]  [phone]    Tiera Griffiths MD, FACOG     Follow-up OB visit    Chief Complaint   Patient presents with    Routine Prenatal Visit       Vitals:    18 1537   BP: 112/78   Weight: 180 lb (81.6 kg)       Patient Active Problem List    Diagnosis Date Noted    Elderly multigravida in first trimester 2018       The patient reports the following concerns: none    See PN flowsheet for exam    41 y.o.  34w4d   Encounter Diagnosis   Name Primary?  Elderly multigravida in first trimester Yes     Keep mfm fu  Disc IOL plan  Pt prefers not waiting to 41 wks if possible  Disc r/b/a of induction and pitocin use and increase risk of longer labor,  section, bleeding and infection. [] SAB/bleeding precautions reviewed   [] PTL/PPROM precautions reviewed   [] Labor precautions reviewed   [] Fetal kick counts discussed   [] Labs reviewed with patient   [] Starleen Goodpasture precautions reviewed   [] Consent reviewed   [] Handouts given to pt   [] Glucola handout    [] GBS/labor/Magic Hour handout   []    []    []    []    Follow-up Disposition:  Return in about 1 week (around 2018) for Follow up OB visit. No orders of the defined types were placed in this encounter.       Tiera Griffiths MD

## 2018-07-02 ENCOUNTER — ROUTINE PRENATAL (OUTPATIENT)
Dept: OBGYN CLINIC | Age: 38
End: 2018-07-02

## 2018-07-02 VITALS
DIASTOLIC BLOOD PRESSURE: 72 MMHG | BODY MASS INDEX: 28.28 KG/M2 | SYSTOLIC BLOOD PRESSURE: 110 MMHG | WEIGHT: 176 LBS | HEIGHT: 66 IN

## 2018-07-02 DIAGNOSIS — O09.521 ELDERLY MULTIGRAVIDA IN FIRST TRIMESTER: Primary | ICD-10-CM

## 2018-07-02 DIAGNOSIS — Z3A.36 36 WEEKS GESTATION OF PREGNANCY: ICD-10-CM

## 2018-07-02 NOTE — PROGRESS NOTES
[facility]  [email or web address]  [phone]    Will Mak MD, FACOG     Follow-up OB visit    Chief Complaint   Patient presents with    Routine Prenatal Visit       Vitals:    18 1325   BP: 110/72   Weight: 176 lb (79.8 kg)   Height: 5' 6\" (1.676 m)       Patient Active Problem List    Diagnosis Date Noted    Gestational diabetes mellitus (GDM) in childbirth, diet controlled 2018    Elderly multigravida in first trimester 2018       The patient reports the following concerns: none    See PN flowsheet for exam    41 y.o.  36w4d   Encounter Diagnoses   Name Primary?  Elderly multigravida in first trimester Yes    36 weeks gestation of pregnancy     Gestational diabetes mellitus (GDM) in childbirth, diet controlled         [] SAB/bleeding precautions reviewed   [x] PTL/PPROM precautions reviewed   [] Labor precautions reviewed   [] Fetal kick counts discussed   [] Labs reviewed with patient   [] Calabrese Ely precautions reviewed   [] Consent reviewed   [] Handouts given to pt   [] Glucola handout    [] GBS/labor/Magic Hour handout   []    []    []    []    Follow-up Disposition:  Return in about 1 week (around 2018) for Follow up OB visit.     Orders Placed This Encounter   Brian Madrid MD

## 2018-07-02 NOTE — PATIENT INSTRUCTIONS

## 2018-07-02 NOTE — MR AVS SNAPSHOT
[address] [facility] [address] [place] [address] 
[phone] Patient: Jaydon Soni MRN: [id number] XSD:9/0/0867 Visit Information Date & Time Provider Department Dept. Phone Encounter #  
 7/2/2018  1:30 PM MD Guillermo Mayer [id number] Follow-up Instructions Return in about 1 week (around 7/9/2018) for Follow up OB visit. Your Appointments 8/1/2018  7:00 AM  
PROCEDURE with MD Guillermo Mayer [facility] Appt Note: Induction [address] [facility] [address] [place] [address]  
[phone]  
  
   
 [address] [address]  
  
    
  
 7/2/2018  1:30 PM  
OB VISIT with MD Guillermo Mayer ([facility]) Appt Note: 2wk fob   36wk  TP  
 [address] [facility] [address] [address]  
[address] Upcoming Health Maintenance Date Due Influenza Age 5 to Adult 8/1/2018 PAP AKA CERVICAL CYTOLOGY 11/14/2019 Allergies as of 7/2/2018  Review Complete On: 7/2/2018 By: Kylie Lopez LPN No Known Allergies Current Immunizations  Never Reviewed Name Date Tdap 5/21/2018 Not reviewed this visit You Were Diagnosed With   
  
 Codes Comments Elderly multigravida in first trimester    -  Primary ICD-10-CM: O09.521 ICD-9-CM: 659.63   
 36 weeks gestation of pregnancy     ICD-10-CM: Z3A.36 
ICD-9-CM: V22.2 Gestational diabetes mellitus (GDM) in childbirth, diet controlled     ICD-10-CM: O22.18 ICD-9-CM: 648.81 Vitals BP Height(growth percentile) Weight(growth percentile) LMP BMI OB Status 110/72 5' 6\" (1.676 m) 176 lb (79.8 kg) 10/25/2017 (Exact Date) 28.41 kg/m2 Pregnant Smoking Status Never Smoker BMI and BSA Data Body Mass Index Body Surface Area  
 28.41 kg/m 2 1.93 m 2 Your Updated Medication List  
  
   
This list is accurate as of 18  1:26 PM.  Always use your most recent med list.  
  
  
  
  
 PRENATAL VITAMIN PO Take  by mouth. Follow-up Instructions Return in about 1 week (around 2018) for Follow up OB visit. To-Do List   
 2018 2:15 PM  
  Appointment with ULTRASOUND 1 SFM at [facility] ([phone]) Patient Instructions Weeks 34 to 36 of Your Pregnancy: Care Instructions Your Care Instructions By now, your baby and your belly have grown quite large. It is almost time to give birth. A full-term pregnancy can deliver between 37 and 42 weeks. Your baby's lungs are almost ready to breathe air. The bones in your baby's head are now firm enough to protect it, but soft enough to move down through the birth canal. 
You may feel excited, happy, anxious, or scared. You may wonder how you will know if you are in labor or what to expect during labor. Try to be flexible in your expectations of the birth. Because each birth is different, there is no way to know exactly what childbirth will be like for you. This care sheet will help you know what to expect and how to prepare. This may make your childbirth easier. If you haven't already had the Tdap shot during this pregnancy, talk to your doctor about getting it. It will help protect your  against pertussis infection. In the 36th week, most women have a test for group B streptococcus (GBS). GBS is a common bacteria that can live in the vagina and rectum. It can make your baby sick after birth. If you test positive, you will get antibiotics during labor. The medicine will keep your baby from getting the bacteria. Follow-up care is a key part of your treatment and safety.  Be sure to make and go to all appointments, and call your doctor if you are having problems. It's also a good idea to know your test results and keep a list of the medicines you take. How can you care for yourself at home? Learn about pain relief choices · Pain is different for every woman. Talk with your doctor about your feelings about pain. · You can choose from several types of pain relief. These include medicine or breathing techniques, as well as comfort measures. You can use more than one option. · If you choose to have pain medicine during labor, talk to your doctor about your options. Some medicines lower anxiety and help with some of the pain. Others make your lower body numb so that you won't feel pain. · Be sure to tell your doctor about your pain medicine choice before you start labor or very early in your labor. You may be able to change your mind as labor progresses. · Rarely, a woman is put to sleep by medicine given through a mask or an IV. Labor and delivery · The first stage of labor has three parts: early, active, and transition. ¨ Most women have early labor at home. You can stay busy or rest, eat light snacks, drink clear fluids, and start counting contractions. ¨ When talking during a contraction gets hard, you may be moving to active labor. During active labor, you should head for the hospital if you are not there already. ¨ You are in active labor when contractions come every 3 to 4 minutes and last about 60 seconds. Your cervix is opening more rapidly. ¨ If your water breaks, contractions will come faster and stronger. ¨ During transition, your cervix is stretching, and contractions are coming more rapidly. ¨ You may want to push, but your cervix might not be ready. Your doctor will tell you when to push. · The second stage starts when your cervix is completely opened and you are ready to push. ¨ Contractions are very strong to push the baby down the birth canal. 
¨ You will feel the urge to push. You may feel like you need to have a bowel movement. ¨ You may be coached to push with contractions. These contractions will be very strong, but you will not have them as often. You can get a little rest between contractions. ¨ You may be emotional and irritable. You may not be aware of what is going on around you. ¨ One last push, and your baby is born. · The third stage is when a few more contractions push out the placenta. This may take 30 minutes or less. · The fourth stage is the welcome recovery. You may feel overwhelmed with emotions and exhausted but alert. This is a good time to start breastfeeding. Where can you learn more? Go to http://mary-nuria.info/. Enter L650 in the search box to learn more about \"Weeks 34 to 36 of Your Pregnancy: Care Instructions. \" Current as of: March 16, 2017 Content Version: 11.4 © 9912-5353 [employer]. Care instructions adapted under license by [employer] (which disclaims liability or warranty for this information). If you have questions about a medical condition or this instruction, always ask your healthcare professional. [address] any warranty or liability for your use of this information. Please provide this summary of care documentation to your next provider. Your primary care clinician is listed as Phys Other. If you have any questions after today's visit, please call [phone].

## 2018-07-04 LAB
GP B STREP DNA SPEC QL NAA+PROBE: POSITIVE
GRBS, EXTERNAL: POSITIVE

## 2018-07-04 NOTE — PROGRESS NOTES
GBS positive, add to prenatal labs and OB problem list.   Notify patient at next visit and give GBS handout.

## 2018-07-11 ENCOUNTER — ROUTINE PRENATAL (OUTPATIENT)
Dept: OBGYN CLINIC | Age: 38
End: 2018-07-11

## 2018-07-11 VITALS — SYSTOLIC BLOOD PRESSURE: 110 MMHG | BODY MASS INDEX: 28.96 KG/M2 | WEIGHT: 179.4 LBS | DIASTOLIC BLOOD PRESSURE: 70 MMHG

## 2018-07-11 DIAGNOSIS — O09.521 ELDERLY MULTIGRAVIDA IN FIRST TRIMESTER: Primary | ICD-10-CM

## 2018-07-11 DIAGNOSIS — Z3A.37 37 WEEKS GESTATION OF PREGNANCY: ICD-10-CM

## 2018-07-11 NOTE — PROGRESS NOTES
[facility]  [email or web address]  [phone]    Jaleel Martinez MD, FACOG     Follow-up OB visit    Chief Complaint   Patient presents with    Routine Prenatal Visit       Vitals:    18 1500   BP: 110/70   Weight: 179 lb 6.4 oz (81.4 kg)       Patient Active Problem List    Diagnosis Date Noted    Gestational diabetes mellitus (GDM) in childbirth, diet controlled 2018    Elderly multigravida in first trimester 2018       The patient reports the following concerns: none    See PN flowsheet for exam    41 y.o.  37w6d   Encounter Diagnoses   Name Primary?  Elderly multigravida in first trimester Yes    Gestational diabetes mellitus (GDM) in childbirth, diet controlled     37 weeks gestation of pregnancy        Continue good bs control  Induction h/o  Disc cx ripening if needed    FMLA paper work given to pt   [] SAB/bleeding precautions reviewed   [] PTL/PPROM precautions reviewed   [] Labor precautions reviewed   [x] Fetal kick counts discussed   [] Labs reviewed with patient   [] Ginny Tillman precautions reviewed   [] Consent reviewed   [] Handouts given to pt   [] Glucola handout    [] GBS/labor/Magic Hour handout   []    []    []    []    Follow-up Disposition: Not on File    No orders of the defined types were placed in this encounter.       Jaleel Martinez MD

## 2018-07-11 NOTE — PATIENT INSTRUCTIONS
Week 38 of Your Pregnancy: Care Instructions  Your Care Instructions    Believe it or not, your baby is almost here. You may have ideas about your baby's personality because of how much he or she moves. Or you may have noticed how he or she responds to sounds, warmth, cold, and light. You may even know what kind of music your baby likes. By now, you have a better idea of what to expect during delivery. You may have talked about your birth preferences with your doctor. But even if you want a vaginal birth, it is a good idea to learn about  births.  birth means that your baby is born through a cut (incision) in your lower belly. It is sometimes the best choice for the health of the baby and the mother. This care sheet can help you understand  births. It also gives you information about what to expect after your baby is born. And it helps you understand more about postpartum depression. Follow-up care is a key part of your treatment and safety. Be sure to make and go to all appointments, and call your doctor if you are having problems. It's also a good idea to know your test results and keep a list of the medicines you take. How can you care for yourself at home? Learn about  birth  · Most C-sections are unplanned. They are done because of problems that occur during labor. These problems might include:  ¨ Labor that slows or stops. ¨ High blood pressure or other problems for the mother. ¨ Signs of distress in the baby. These signs may include a very fast or slow heart rate. · Although most mothers and babies do well after , it is major surgery. It has more risks than a vaginal delivery. · In some cases, a planned  may be safer than a vaginal delivery. This may be the case if:  ¨ The mother has a health problem, such as a heart condition. ¨ The baby isn't in a head-down position for delivery. This is called a breech position.   ¨ The uterus has scars from past surgeries. This could increase the chance of a tear in the uterus. ¨ There is a problem with the placenta. ¨ The mother has an infection, such as genital herpes, that could be spread to the baby. ¨ The mother is having twins or more. ¨ The baby weighs 9 to 10 pounds or more. · Because of the risks of , planned C-sections generally should be done only for medical reasons. And a planned  should be done at 39 weeks or later unless there is a medical reason to do it sooner. Know what to expect after delivery, and plan for the first few weeks at home  · You, your baby, and your partner or  will get identification bands. Only people with matching bands can  the baby from the nursery. · You will learn how to feed, diaper, and bathe your baby. And you will learn how to care for the umbilical cord stump. If your baby will be circumcised, you will also learn how to care for that. · Ask people to wait to visit you until you are at home. And ask them to wash their hands before they touch your baby. · Make sure you have another adult in your home for at least 2 or 3 days after the birth. · During the first 2 weeks, limit when friends and family can visit. · Do not allow visitors who have colds or infections. Make sure all visitors are up to date with their vaccinations. Never let anyone smoke around your baby. · Try to nap when the baby naps. Be aware of postpartum depression  · \"Baby blues\" are common for the first 1 to 2 weeks after birth. You may cry or feel sad or irritable for no reason. · For some women, these feelings last longer and are more intense. This is called postpartum depression. · If your symptoms last for more than a few weeks or you feel very depressed, ask your doctor for help. · Postpartum depression can be treated. Support groups and counseling can help. Sometimes medicine can also help. Where can you learn more?   Go to http://mary-nuria.info/. Enter B044 in the search box to learn more about \"Week 38 of Your Pregnancy: Care Instructions. \"  Current as of: November 21, 2017  Content Version: 11.7  © 5875-9175 [employer]. Care instructions adapted under license by [employer] (which disclaims liability or warranty for this information). If you have questions about a medical condition or this instruction, always ask your healthcare professional. [address] any warranty or liability for your use of this information.

## 2018-07-13 ENCOUNTER — HOSPITAL ENCOUNTER (OUTPATIENT)
Dept: PERINATAL CARE | Age: 38
Discharge: HOME | End: 2018-07-13
Attending: OBSTETRICS & GYNECOLOGY
Payer: COMMERCIAL

## 2018-07-13 PROCEDURE — 76816 OB US FOLLOW-UP PER FETUS: CPT | Performed by: OBSTETRICS & GYNECOLOGY

## 2018-07-13 PROCEDURE — 76818 FETAL BIOPHYS PROFILE W/NST: CPT | Performed by: OBSTETRICS & GYNECOLOGY

## 2018-07-20 ENCOUNTER — ROUTINE PRENATAL (OUTPATIENT)
Dept: OBGYN CLINIC | Age: 38
End: 2018-07-20

## 2018-07-20 ENCOUNTER — TELEPHONE (OUTPATIENT)
Dept: OBGYN CLINIC | Age: 38
End: 2018-07-20

## 2018-07-20 VITALS
HEIGHT: 66 IN | DIASTOLIC BLOOD PRESSURE: 76 MMHG | WEIGHT: 176 LBS | SYSTOLIC BLOOD PRESSURE: 123 MMHG | BODY MASS INDEX: 28.28 KG/M2 | HEART RATE: 79 BPM

## 2018-07-20 DIAGNOSIS — O09.521 ELDERLY MULTIGRAVIDA IN FIRST TRIMESTER: Primary | ICD-10-CM

## 2018-07-20 NOTE — PROGRESS NOTES
Lost mucous plug. Some cramping, no reg ctx/LOF/VB. Labor/ROM prec. RTO 7/24 with Dr. Joana Prakash as scheduled.

## 2018-07-20 NOTE — MR AVS SNAPSHOT
[address] [place] [address] [facility] [address] [place] [address] 
[phone] Patient: Sacha Harris MRN: [id number] AIT:6/[birth year] Visit Information Date & Time Provider Department Dept. Phone Encounter #  
 7/20/2018  1:40  [address] [phone] [id number] Your Appointments 8/1/2018  7:00 AM  
PROCEDURE with MD Guillermo Ortiz ([address] [place] [address] Road) Appt Note: Induction [address] [facility] [address] [place] [address]  
[phone]  
  
   
 [address] [place] [address]  
  
    
  
 7/24/2018  2:50 PM  
OB VISIT with MD Guillermo Ortiz ([facility]) Appt Note: 1wk fob  ok per ML  TP  
 [address] [place] [address] [address] [place] [address]  
[phone]  
  
   
 [address] [address]  
  
    
 7/31/2018  2:50 PM  
OB VISIT with MD Guillermo Ortiz ([facility] [address]) Appt Note: fob kim  ok per TP  
 [address] [place] [address] [address] [place] [address]  
[phone] Upcoming Health Maintenance Date Due Influenza Age 5 to Adult 8/1/2018 PAP AKA CERVICAL CYTOLOGY 11/14/2019 Allergies as of 7/20/2018  Review Complete On: 7/20/2018 By: Jacqueline Bloch No Known Allergies Current Immunizations  Never Reviewed Name Date Tdap 5/21/2018 Not reviewed this visit Vitals BP Pulse Height(growth percentile) Weight(growth percentile) LMP BMI  
 123/76 79 5' 6\" (1.676 m) 176 lb (79.8 kg) 10/25/2017 (Exact Date) 28.41 kg/m2 OB Status Smoking Status Pregnant Never Smoker BMI and BSA Data Body Mass Index Body Surface Area  
 28.41 kg/m 2 1.93 m 2 Your Updated Medication List  
  
   
This list is accurate as of 7/20/18  1:54 PM.  Always use your most recent med list.  
  
  
  
  
 PRENATAL VITAMIN PO Take  by mouth. To-Do List   
 2018 2:15 PM  
  Appointment with ULTRASOUND 1 SFM at [facility] ([phone]) Patient Instructions [facility] Desk: [phone] Week 39 of Your Pregnancy: Care Instructions Your Care Instructions During these final weeks, you may feel anxious to see your new baby.  babies often look different from what you see in pictures or movies. Right after birth, their heads may have a strange shape. Their eyes may be puffy. And their genitals may be swollen. They may also have very dry skin, or red marks on the eyelids, nose, or neck. Still, most parents think their babies are beautiful. Follow-up care is a key part of your treatment and safety. Be sure to make and go to all appointments, and call your doctor if you are having problems. It's also a good idea to know your test results and keep a list of the medicines you take. How can you care for yourself at home? Prepare to breastfeed · If you are breastfeeding, continue to eat healthy foods. · Avoid alcohol, cigarettes, and drugs. This includes prescription and over-the-counter medicines. · You can help prevent sore nipples if you feed your baby in the correct position. Nurses will help you learn to do this. · Your  will need to be fed about every 1½ to 3 hours. Choose the right birth control after your baby is born · Women who are breastfeeding can still get pregnant. Use birth control if you don't want to get pregnant. · Intrauterine devices (IUDs) work for women who want to wait at least 2 years before getting pregnant again. They are safe to use while you are breastfeeding. · Depo-Provera can be used while you are breastfeeding. It is a shot you get every 3 months. · Birth control pills work well. But you need a different kind of pill while you are breastfeeding.  And when you start taking these pills, you need to make sure to use another type of birth control until you start your second pack. · Diaphragms, cervical caps, tubal implants, and condoms with spermicide work less well after birth. If you have a diaphragm or cervical cap, you will need to have it refitted. · Tubal ligation (tying your tubes) and vasectomy are both permanent. These are good options if you are sure you are done having children. Where can you learn more? Go to http://mary-nuria.info/. Enter G473 in the search box to learn more about \"Week 39 of Your Pregnancy: Care Instructions. \" Current as of: November 21, 2017 Content Version: 11.7 © 9660-4810 [employer]. Care instructions adapted under license by [employer] (which disclaims liability or warranty for this information). If you have questions about a medical condition or this instruction, always ask your healthcare professional. [address] any warranty or liability for your use of this information. Please provide this summary of care documentation to your next provider. Your primary care clinician is listed as Phys Other. If you have any questions after today's visit, please call [phone].

## 2018-07-20 NOTE — PATIENT INSTRUCTIONS
[facility] Desk: [phone]       Week 44 of Your Pregnancy: Care Instructions  Your Care Instructions    During these final weeks, you may feel anxious to see your new baby.  babies often look different from what you see in pictures or movies. Right after birth, their heads may have a strange shape. Their eyes may be puffy. And their genitals may be swollen. They may also have very dry skin, or red marks on the eyelids, nose, or neck. Still, most parents think their babies are beautiful. Follow-up care is a key part of your treatment and safety. Be sure to make and go to all appointments, and call your doctor if you are having problems. It's also a good idea to know your test results and keep a list of the medicines you take. How can you care for yourself at home? Prepare to breastfeed  · If you are breastfeeding, continue to eat healthy foods. · Avoid alcohol, cigarettes, and drugs. This includes prescription and over-the-counter medicines. · You can help prevent sore nipples if you feed your baby in the correct position. Nurses will help you learn to do this. · Your  will need to be fed about every 1½ to 3 hours. Choose the right birth control after your baby is born  · Women who are breastfeeding can still get pregnant. Use birth control if you don't want to get pregnant. · Intrauterine devices (IUDs) work for women who want to wait at least 2 years before getting pregnant again. They are safe to use while you are breastfeeding. · Depo-Provera can be used while you are breastfeeding. It is a shot you get every 3 months. · Birth control pills work well. But you need a different kind of pill while you are breastfeeding. And when you start taking these pills, you need to make sure to use another type of birth control until you start your second pack. · Diaphragms, cervical caps, tubal implants, and condoms with spermicide work less well after birth.  If you have a diaphragm or cervical cap, you will need to have it refitted. · Tubal ligation (tying your tubes) and vasectomy are both permanent. These are good options if you are sure you are done having children. Where can you learn more? Go to http://mary-nuria.info/. Enter N011 in the search box to learn more about \"Week 39 of Your Pregnancy: Care Instructions. \"  Current as of: November 21, 2017  Content Version: 11.7  © 3555-1405 [employer]. Care instructions adapted under license by [employer] (which disclaims liability or warranty for this information). If you have questions about a medical condition or this instruction, always ask your healthcare professional. [address] any warranty or liability for your use of this information.

## 2018-07-20 NOTE — TELEPHONE ENCOUNTER
Call received at 9:52AM      41year old  39w1d pregnant patient last seen in the office on 18. Patient denies vaginal bleeding, ROM, regular contractions and reports positive fetal movements      Patient reports loosing a yellowish mucous plug this am. Patient wondering who to proceed. Patient advised to keep her appointment to be seen to day at 1:40PM.      Patient verbalized understanding.

## 2018-07-22 ENCOUNTER — HOSPITAL ENCOUNTER (EMERGENCY)
Age: 38
Discharge: HOME | End: 2018-07-22
Attending: OBSTETRICS & GYNECOLOGY | Admitting: OBSTETRICS & GYNECOLOGY
Payer: COMMERCIAL

## 2018-07-22 VITALS
DIASTOLIC BLOOD PRESSURE: 79 MMHG | RESPIRATION RATE: 16 BRPM | HEART RATE: 86 BPM | SYSTOLIC BLOOD PRESSURE: 119 MMHG | TEMPERATURE: 97.9 F

## 2018-07-22 PROCEDURE — 59025 FETAL NON-STRESS TEST: CPT

## 2018-07-22 PROCEDURE — 99284 EMERGENCY DEPT VISIT MOD MDM: CPT

## 2018-07-22 NOTE — IP AVS SNAPSHOT
Summary of Care Report The Summary of Care report has been created to help improve care coordination. Users with access to [employer] or [address] (Web-based application) may access additional patient information including the Discharge Summary. If you are not currently a [address] user and need more information, please call the number listed below in the [place] [address] section and ask to be connected with Medical Records. Facility Information Name Address Phone [facility] [address] [phone] Patient Information Patient Name Sex LINDY Vogt ([id number]) Female 1980 Discharge Information Admitting Provider Service Area Unit Chyna Rust MD / Nichole. [place] [facility] 2 Labor & Delivery / [phone] Discharge Provider Discharge Date/Time Discharge Disposition Destination (none) (none) (none) (none) Patient Language Language ENGLISH [13] Hospital Problems as of 2018  Reviewed: 2018  2:15 PM by Erika Villanueva MD  
 None Non-Hospital Problems as of 2018  Reviewed: 2018  2:15 PM by Erika Villanueva MD  
  
  
  
 Class Noted - Resolved Last Modified Active Problems Elderly multigravida in first trimester  2018 - Present 2018 by Conception Yanet Entered by Killian Hoffmann MD  
  Overview Addendum 2018  9:07 AM by Conception Yanet Olivia testing next visit WITH gender: declined 19 Needs to sign testing consents at 16 week visit. Declines genetic screening/testing/AFP 
20 wk FS w/ genetic counseling Ant plac :LLP vs previa: has MFM fu US 
1hr GTT- 142 (H) 3 hr gtt- #69, #209, #194, #134 GDM diet controlled. (MFM rec delivery 41 wks if well controlled) [facility] Dr. Kelli Bowles on 18 at 10:00am, DTC request sent, MFM notified Induction 8/1/18. Pt notified. Instructions mailed. Gestational diabetes mellitus (GDM) in childbirth, diet controlled  7/2/2018 - Present 7/2/2018 by Adrian Sol MD  
  Entered by Adrian Sol MD  
  
You are allergic to the following No active allergies Current Discharge Medication List  
  
ASK your doctor about these medications Dose & Instructions Dispensing Information Comments PRENATAL VITAMIN PO Take  by mouth. Refills:  0 Current Immunizations Name Date Tdap 5/21/2018 Follow-up Information None Discharge Instructions **KEEP NEXT APPOINTMENT WITH DR Eulalio Guzman** Early Stage of Labor at Home: Care Instructions Your Care Instructions ** If you came to the hospital while in early labor, your doctor may have asked if you want to labor at home until your contractions are stronger. Many women stay at home during early labor. This is often the longest part of the birthing process. It may last up to 2 to 3 days. Contractions are mild to moderate and shorter (about 30 to 45 seconds). You can usually keep talking during them. Contractions may also be irregular, about 5 to 20 minutes apart. They may even stop for a while. It helps to stay as relaxed as you can during this time. You can spend some or all of your early labor at home or anywhere else you may be comfortable. If you live far from the hospital or birthing center, you may want to think about going somewhere nearby so you can get back to the hospital quickly. For some women, there may be benefits to staying home during early labor, such as avoiding medicines or procedures. As labor progresses, you'll shift from early labor to active labor. During this time, contractions get more intense. They occur more often, about every 2 to 3 minutes. They also last longer, about 50 to 70 seconds.  You will feel them even when you change positions and walk or move around. It may be hard to tell if you are in active labor. If you aren't sure, call your doctor or midwife. As your labor progresses, check in with your doctor or midwife about when to come back to the hospital or birthing center. You may have special instructions if your water broke or you tested positive for group B strep. Follow-up care is a key part of your treatment and safety. Be sure to make and go to all appointments, and call your doctor if you are having problems. It's also a good idea to know your test results and keep a list of the medicines you take. How can you care for yourself at home? · Get support. Having a support person with you from early labor until after childbirth can have a positive effect on childbirth. · Find distractions. During early labor, you can walk, play cards, watch TV, or listen to music to help take your mind off your contractions. · Ask your partner, labor , or  for a massage. Shoulder and low back massage during contractions may ease your pain. Strong massage of the back muscles (counterpressure) during contractions may help relieve the pain of back labor. Tell your labor  exactly where to push and how hard to push. · Use imagery. This means using your imagination to decrease your pain. For instance, to help manage pain, picture your contractions as waves rolling over you. Picture a peaceful place, such as a beach or mountain stream, to help you relax between contractions. · Change positions during labor. Walking, kneeling, or sitting on a big rubber ball (birth ball) are good options. · Use focused breathing techniques. Breathing in a rhythm can distract you from pain. · Take a warm shower or bath. Warm water may ease pain and stress. When should you call for help? Call 911 anytime you think you may need emergency care. For example, call if: 
  · You passed out (lost consciousness).   · You have severe vaginal bleeding.  
  · You have severe pain in your belly or pelvis.  
  · You have had fluid gushing or leaking from your vagina and you know or think the umbilical cord is bulging into your vagina. If this happens, immediately get down on your knees so your rear end (buttocks) is higher than your head. This will decrease the pressure on the cord until help arrives.  
[facility] your doctor now or seek immediate medical care if: 
  · You have new or worse signs of preeclampsia, such as: 
¨ Sudden swelling of your face, hands, or feet. ¨ New vision problems (such as dimness or blurring). ¨ A severe headache.  
  · You have any vaginal bleeding.  
  · You have belly pain or cramping.  
  · You have a fever.  
  · You have had regular contractions (with or without pain) for an hour. This means that you have 8 or more within 1 hour or 4 or more in 20 minutes after you change your position and drink fluids.  
  · You have a sudden release of fluid from your vagina.  
  · You have low back pain or pelvic pressure that does not go away.  
  · You notice that your baby has stopped moving or is moving much less than normal.  
 Watch closely for changes in your health, and be sure to contact your doctor if you have any problems. Where can you learn more? Go to http://mary-nuria.info/. Enter T366 in the search box to learn more about \"Early Stage of Labor at Home: Care Instructions. \" Current as of: November 21, 2017 Content Version: 11.7 © 0955-3640 [employer]ware[employer]. Care instructions adapted under license by [employer] (which disclaims liability or warranty for this information). If you have questions about a medical condition or this instruction, always ask your healthcare professional. [place] [address] any warranty or liability for your use of this information. Counting Your Baby's Kicks: Care Instructions Your Care Instructions Counting your baby's kicks is one way your doctor can tell that your baby is healthy. Most women-especially in a first pregnancy-feel their baby move for the first time between 16 and 22 weeks. The movement may feel like flutters rather than kicks. Your baby may move more at certain times of the day. When you are active, you may notice less kicking than when you are resting. At your prenatal visits, your doctor will ask whether the baby is active. In your last trimester, your doctor may ask you to count the number of times you feel your baby move. Follow-up care is a key part of your treatment and safety. Be sure to make and go to all appointments, and call your doctor if you are having problems. It's also a good idea to know your test results and keep a list of the medicines you take. How do you count fetal kicks? · A common method of checking your baby's movement is to count the number of kicks or moves you feel in 1 hour. Ten movements (such as kicks, flutters, or rolls) in 1 hour are normal. Some doctors suggest that you count in the morning until you get to 10 movements. Then you can quit for that day and start again the next day. · Pick your baby's most active time of day to count. This may be any time from morning to evening. · If you do not feel 10 movements in an hour, your baby may be sleeping. Wait for the next hour and count again. When should you call for help? Call your doctor now or seek immediate medical care if: 
  · You noticed that your baby has stopped moving or is moving much less than normal.  
 Watch closely for changes in your health, and be sure to contact your doctor if you have any problems. Where can you learn more? Go to http://mary-nuria.info/. Enter Q263 in the search box to learn more about \"Counting Your Baby's Kicks: Care Instructions. \" Current as of: November 21, 2017 Content Version: 11.7 © 8196-0855 Healthwise[employer] Incorporated. Care instructions adapted under license by [employer] (which disclaims liability or warranty for this information). If you have questions about a medical condition or this instruction, always ask your healthcare professional. [place] [address] any warranty or liability for your use of this information. Chart Review Routing History Recipient Method Report Sent By Julisa Barba MD  
Phone: [phone] [facility] CUSTOM LAB REPORT Slick Beasley [[id number]] 5/17/2018  9:50 AM 5/16/2018

## 2018-07-22 NOTE — IP AVS SNAPSHOT
[address] 
[phone] Patient: Cuca Jain MRN: [id number] QAN:[birth year] About your hospitalization You were admitted on:  N/A You last received care in the:  [facility] 2 LABOR & DELIVERY You were discharged on:  July 22, 2018 Why you were hospitalized Your primary diagnosis was:  Not on File Follow-up Information None Your Scheduled Appointments Tuesday July 24, 2018  2:50 PM EDT  
OB VISIT with MD Guillermo Mendenhall ([address] [place] [address]) [address] [place] [address]  
[phone] Tuesday July 31, 2018  2:50 PM EDT  
OB VISIT with MD Guillermo Mendenhall ([address] [place] [address]) [address] [place] [address]  
[phone] Wednesday August 01, 2018  7:00 AM EDT PROCEDURE with MD Guillermo Mendenhall ([address] [place] [address]) [address] [place] [address]  
[phone] Discharge Orders None A check jimmie indicates which time of day the medication should be taken. My Medications ASK your doctor about these medications Instructions Each Dose to Equal  
 Morning Noon Evening Bedtime PRENATAL VITAMIN PO Your last dose was: Your next dose is: Take  by mouth. Discharge Instructions **KEEP NEXT APPOINTMENT WITH DR Mane Guidry** Early Stage of Labor at Home: Care Instructions Your Care Instructions ** If you came to the hospital while in early labor, your doctor may have asked if you want to labor at home until your contractions are stronger. Many women stay at home during early labor. This is often the longest part of the birthing process. It may last up to 2 to 3 days.  Contractions are mild to moderate and shorter (about 30 to 45 seconds). You can usually keep talking during them. Contractions may also be irregular, about 5 to 20 minutes apart. They may even stop for a while. It helps to stay as relaxed as you can during this time. You can spend some or all of your early labor at home or anywhere else you may be comfortable. If you live far from the hospital or birthing center, you may want to think about going somewhere nearby so you can get back to the hospital quickly. For some women, there may be benefits to staying home during early labor, such as avoiding medicines or procedures. As labor progresses, you'll shift from early labor to active labor. During this time, contractions get more intense. They occur more often, about every 2 to 3 minutes. They also last longer, about 50 to 70 seconds. You will feel them even when you change positions and walk or move around. It may be hard to tell if you are in active labor. If you aren't sure, call your doctor or midwife. As your labor progresses, check in with your doctor or midwife about when to come back to the hospital or birthing center. You may have special instructions if your water broke or you tested positive for group B strep. Follow-up care is a key part of your treatment and safety. Be sure to make and go to all appointments, and call your doctor if you are having problems. It's also a good idea to know your test results and keep a list of the medicines you take. How can you care for yourself at home? · Get support. Having a support person with you from early labor until after childbirth can have a positive effect on childbirth. · Find distractions. During early labor, you can walk, play cards, watch TV, or listen to music to help take your mind off your contractions. · Ask your partner, labor , or  for a massage. Shoulder and low back massage during contractions may ease your pain.  Strong massage of the back muscles (counterpressure) during contractions may help relieve the pain of back labor. Tell your labor  exactly where to push and how hard to push. · Use imagery. This means using your imagination to decrease your pain. For instance, to help manage pain, picture your contractions as waves rolling over you. Picture a peaceful place, such as a beach or mountain stream, to help you relax between contractions. · Change positions during labor. Walking, kneeling, or sitting on a big rubber ball (birth ball) are good options. · Use focused breathing techniques. Breathing in a rhythm can distract you from pain. · Take a warm shower or bath. Warm water may ease pain and stress. When should you call for help? Call 911 anytime you think you may need emergency care. For example, call if: 
  · You passed out (lost consciousness).  
  · You have severe vaginal bleeding.  
  · You have severe pain in your belly or pelvis.  
  · You have had fluid gushing or leaking from your vagina and you know or think the umbilical cord is bulging into your vagina. If this happens, immediately get down on your knees so your rear end (buttocks) is higher than your head. This will decrease the pressure on the cord until help arrives.  
[facility] your doctor now or seek immediate medical care if: 
  · You have new or worse signs of preeclampsia, such as: 
¨ Sudden swelling of your face, hands, or feet. ¨ New vision problems (such as dimness or blurring). ¨ A severe headache.  
  · You have any vaginal bleeding.  
  · You have belly pain or cramping.  
  · You have a fever.  
  · You have had regular contractions (with or without pain) for an hour. This means that you have 8 or more within 1 hour or 4 or more in 20 minutes after you change your position and drink fluids.  
  · You have a sudden release of fluid from your vagina.  
  · You have low back pain or pelvic pressure that does not go away.   · You notice that your baby has stopped moving or is moving much less than normal.  
 Watch closely for changes in your health, and be sure to contact your doctor if you have any problems. Where can you learn more? Go to http://mary-nuria.info/. Enter R860 in the search box to learn more about \"Early Stage of Labor at Home: Care Instructions. \" Current as of: November 21, 2017 Content Version: 11.7 © 9881-5608 [employer]acai. Care instructions adapted under license by [employer] (which disclaims liability or warranty for this information). If you have questions about a medical condition or this instruction, always ask your healthcare professional. [address] any warranty or liability for your use of this information. Counting Your Baby's Kicks: Care Instructions Your Care Instructions Counting your baby's kicks is one way your doctor can tell that your baby is healthy. Most women-especially in a first pregnancy-feel their baby move for the first time between 16 and 22 weeks. The movement may feel like flutters rather than kicks. Your baby may move more at certain times of the day. When you are active, you may notice less kicking than when you are resting. At your prenatal visits, your doctor will ask whether the baby is active. In your last trimester, your doctor may ask you to count the number of times you feel your baby move. Follow-up care is a key part of your treatment and safety. Be sure to make and go to all appointments, and call your doctor if you are having problems. It's also a good idea to know your test results and keep a list of the medicines you take. How do you count fetal kicks? · A common method of checking your baby's movement is to count the number of kicks or moves you feel in 1 hour.  Ten movements (such as kicks, flutters, or rolls) in 1 hour are normal. Some doctors suggest that you count in the morning until you get to 10 movements. Then you can quit for that day and start again the next day. · Pick your baby's most active time of day to count. This may be any time from morning to evening. · If you do not feel 10 movements in an hour, your baby may be sleeping. Wait for the next hour and count again. When should you call for help? Call your doctor now or seek immediate medical care if: 
  · You noticed that your baby has stopped moving or is moving much less than normal.  
 Watch closely for changes in your health, and be sure to contact your doctor if you have any problems. Where can you learn more? Go to http://mary-nruia.info/. Enter Q660 in the search box to learn more about \"Counting Your Baby's Kicks: Care Instructions. \" Current as of: November 21, 2017 Content Version: 11.7 © 9637-0094 ShopTap. Care instructions adapted under license by [employer] (which disclaims liability or warranty for this information). If you have questions about a medical condition or this instruction, always ask your healthcare professional. [address] any warranty or liability for your use of this information. Introducing Matthew Sams As a [facility] patient, I wanted to make you aware of our electronic visit tool called Matthew Sams. [facility] 24/7 allows you to connect within minutes with a medical provider 24 hours a day, seven days a week via a mobile device or tablet or logging into a secure website from your computer. You can access Matthew Sams from anywhere in the [place].  
 
A virtual visit might be right for you when you have a simple condition and feel like you just dont want to get out of bed, or cant get away from work for an appointment, when your regular [facility] provider is not available (evenings, weekends or holidays), or when youre out of town and need minor care. Electronic visits cost only $49 and if the [employer] 24/7 provider determines a prescription is needed to treat your condition, one can be electronically transmitted to a nearby pharmacy*. Please take a moment to enroll today if you have not already done so. The enrollment process is free and takes just a few minutes. To enroll, please download the [employer] 24/7 felipe to your tablet or phone, or visit [email or web address] to enroll on your computer. And, as an [address] [place] [address] patient with a [employer] account, the results of your visits will be scanned into your electronic medical record and your primary care provider will be able to view the scanned results. We urge you to continue to see your regular [employer] provider for your ongoing medical care. And while your primary care provider may not be the one available when you seek a [facility] virtual visit, the peace of mind you get from getting a real diagnosis real time can be priceless. For more information on 117[employer], view our Frequently Asked Questions (FAQs) at [email or web address]. Sincerely, 
 
Dusty Ozuna MD 
Chief Medical Officer [employer] *:  certain medications cannot be prescribed via 117[employer] Providers Seen During Your Hospitalization Provider Specialty Primary office phone Olivia Muñiz MD Obstetrics & Gynecology [phone] Your Primary Care Physician (PCP) Primary Care Physician Office Phone Office Fax OTHER, PHYS ** None ** ** None ** You are allergic to the following No active allergies Recent Documentation OB Status Smoking Status Pregnant Never Smoker Emergency Contacts Name Discharge Info Relation Home Work Mobile Helene Allen DISCHARGE CAREGIVER [3] Boyfriend [17] [phone] Mert Potter  Other Relative [6] [phone] Patient Belongings The following personal items are in your possession at time of discharge: 
                             
 
  
  
 Please provide this summary of care documentation to your next provider. Signatures-by signing, you are acknowledging that this After Visit Summary has been reviewed with you and you have received a copy. Patient Signature:  ____________________________________________________________ Date:  ____________________________________________________________  
  
Yuliana Hero Provider Signature:  ____________________________________________________________ Date:  ____________________________________________________________

## 2018-07-23 NOTE — PROGRESS NOTES
8:07 PM  Pt presents to  c/o spotting this afternoon/evening after losing mucus plug. Denies ROM, reports active fetus. EFM initiated, pt oriented to unit and call system. Family at bedside, pt in no distress. 8:26 PM  SVE done with some mucousy red discharge noted on glove. No blood on peripad worn in by patient from home. Pad placed on pt to monitor. 8:40 PM  Reviewed strip and pt with Dr Miri Alvarez. MD in to speak with pt. Discharge order recvd. 8:55 PM  D/C instructions given to pt and s/o, questions answered. Verbalized understanding. 9:03 PM  Pt left floor ambulatory in no distress with spouse.

## 2018-07-23 NOTE — H&P
History & Physical    Name: Hemant Linares MRN: [id number]  SSN: [id number]    YOB: 1980  Age: 41 y.o. Sex: female        Subjective:     Estimated Date of Delivery: 18  OB History    Para Term  AB Living   3    2    SAB TAB Ectopic Molar Multiple Live Births   2           # Outcome Date GA Lbr Akhil/2nd Weight Sex Delivery Anes PTL Lv   3 Current            2 SAB 06/15/17           1 SAB               Obstetric Comments   2015, early       Ms. Baker is seen with pregnancy at 39w3d for [facility] discharge . Prenatal course was normal. Please see prenatal records for details. Past Medical History:   Diagnosis Date    Papanicolaou smear for cervical cancer screening 2016    Neg pap and neg HPV     History reviewed. No pertinent surgical history. Social History     Occupational History    Not on file. Social History Main Topics    Smoking status: Never Smoker    Smokeless tobacco: Never Used    Alcohol use No    Drug use: No    Sexual activity: Yes     Partners: Male     Birth control/ protection: None     Family History   Problem Relation Age of Onset    Family history unknown: Yes       No Known Allergies  Prior to Admission medications    Medication Sig Start Date End Date Taking? Authorizing Provider   PRENATAL VIT CALC,IRON,FOLIC (PRENATAL VITAMIN PO) Take  by mouth. Historical Provider        Review of Systems: Constitutional: negative  Respiratory: negative  Cardiovascular: negative  Gastrointestinal: negative  Genitourinary:negative  Hematologic/lymphatic: negative  Musculoskeletal:negative  Neurological: negative  Behavioral/Psych: negative    Objective:     Vitals:  Vitals:    18   BP: 119/79   Pulse: 86   Resp: 16   Temp: 97.9 °F (36.6 °C)        Physical Exam:  Patient without distress.   Heart: Regular rate and rhythm  Lung: clear to auscultation throughout lung fields and normal respiratory effort  Abdomen: soft, nontender  Perineum: blood absent, amniotic fluid absent  Cervical Exam: Closed/Thick/High  Membranes:  Intact  Fetal Heart Rate: cat I tracing without decels random contractions noted    Prenatal Labs:   Lab Results   Component Value Date/Time    ABO/Rh(D) B POSITIVE 09/19/2015 10:30 AM    Rubella, External immune 12/23/2017    GrBStrep, External positive 07/04/2018    HBsAg, External Negative 12/23/2017    HIV, External Non reactive 12/23/2017    Gonorrhea, External Negative 12/23/2017    Chlamydia, External Negative 12/23/2017    ABO,Rh B positive 12/23/2017         Assessment/Plan:      False labor     Plan: Patient given labor precautions Follow up in office at scheduled appointment     Signed By:  Polo Matthews MD     July 22, 2018

## 2018-07-23 NOTE — DISCHARGE INSTRUCTIONS
**KEEP NEXT APPOINTMENT WITH DR Phoebe Macedo**     Early Stage of Labor at Home: Care Instructions  Your Care Instructions  **  If you came to the hospital while in early labor, your doctor may have asked if you want to labor at home until your contractions are stronger. Many women stay at home during early labor. This is often the longest part of the birthing process. It may last up to 2 to 3 days. Contractions are mild to moderate and shorter (about 30 to 45 seconds). You can usually keep talking during them. Contractions may also be irregular, about 5 to 20 minutes apart. They may even stop for a while. It helps to stay as relaxed as you can during this time. You can spend some or all of your early labor at home or anywhere else you may be comfortable. If you live far from the hospital or birthing center, you may want to think about going somewhere nearby so you can get back to the hospital quickly. For some women, there may be benefits to staying home during early labor, such as avoiding medicines or procedures. As labor progresses, you'll shift from early labor to active labor. During this time, contractions get more intense. They occur more often, about every 2 to 3 minutes. They also last longer, about 50 to 70 seconds. You will feel them even when you change positions and walk or move around. It may be hard to tell if you are in active labor. If you aren't sure, call your doctor or midwife. As your labor progresses, check in with your doctor or midwife about when to come back to the hospital or birthing center. You may have special instructions if your water broke or you tested positive for group B strep. Follow-up care is a key part of your treatment and safety. Be sure to make and go to all appointments, and call your doctor if you are having problems. It's also a good idea to know your test results and keep a list of the medicines you take. How can you care for yourself at home? · Get support.  Having a support person with you from early labor until after childbirth can have a positive effect on childbirth. · Find distractions. During early labor, you can walk, play cards, watch TV, or listen to music to help take your mind off your contractions. · Ask your partner, labor , or  for a massage. Shoulder and low back massage during contractions may ease your pain. Strong massage of the back muscles (counterpressure) during contractions may help relieve the pain of back labor. Tell your labor  exactly where to push and how hard to push. · Use imagery. This means using your imagination to decrease your pain. For instance, to help manage pain, picture your contractions as waves rolling over you. Picture a peaceful place, such as a beach or mountain stream, to help you relax between contractions. · Change positions during labor. Walking, kneeling, or sitting on a big rubber ball (birth ball) are good options. · Use focused breathing techniques. Breathing in a rhythm can distract you from pain. · Take a warm shower or bath. Warm water may ease pain and stress. When should you call for help? Call 911 anytime you think you may need emergency care. For example, call if:    · You passed out (lost consciousness).     · You have severe vaginal bleeding.     · You have severe pain in your belly or pelvis.     · You have had fluid gushing or leaking from your vagina and you know or think the umbilical cord is bulging into your vagina. If this happens, immediately get down on your knees so your rear end (buttocks) is higher than your head. This will decrease the pressure on the cord until help arrives.   [facility] your doctor now or seek immediate medical care if:    · You have new or worse signs of preeclampsia, such as:  ¨ Sudden swelling of your face, hands, or feet. ¨ New vision problems (such as dimness or blurring).   ¨ A severe headache.     · You have any vaginal bleeding.     · You have belly pain or cramping.     · You have a fever.     · You have had regular contractions (with or without pain) for an hour. This means that you have 8 or more within 1 hour or 4 or more in 20 minutes after you change your position and drink fluids.     · You have a sudden release of fluid from your vagina.     · You have low back pain or pelvic pressure that does not go away.     · You notice that your baby has stopped moving or is moving much less than normal.    Watch closely for changes in your health, and be sure to contact your doctor if you have any problems. Where can you learn more? Go to http://mary-nuria.info/. Enter C614 in the search box to learn more about \"Early Stage of Labor at Home: Care Instructions. \"  Current as of: November 21, 2017  Content Version: 11.7  © 3058-5244 [employer]. Care instructions adapted under license by [employer] (which disclaims liability or warranty for this information). If you have questions about a medical condition or this instruction, always ask your healthcare professional. [place] [address] any warranty or liability for your use of this information. Counting Your Baby's Kicks: Care Instructions  Your Care Instructions    Counting your baby's kicks is one way your doctor can tell that your baby is healthy. Most women-especially in a first pregnancy-feel their baby move for the first time between 16 and 22 weeks. The movement may feel like flutters rather than kicks. Your baby may move more at certain times of the day. When you are active, you may notice less kicking than when you are resting. At your prenatal visits, your doctor will ask whether the baby is active. In your last trimester, your doctor may ask you to count the number of times you feel your baby move. Follow-up care is a key part of your treatment and safety.  Be sure to make and go to all appointments, and call your doctor if you are having problems. It's also a good idea to know your test results and keep a list of the medicines you take. How do you count fetal kicks? · A common method of checking your baby's movement is to count the number of kicks or moves you feel in 1 hour. Ten movements (such as kicks, flutters, or rolls) in 1 hour are normal. Some doctors suggest that you count in the morning until you get to 10 movements. Then you can quit for that day and start again the next day. · Pick your baby's most active time of day to count. This may be any time from morning to evening. · If you do not feel 10 movements in an hour, your baby may be sleeping. Wait for the next hour and count again. When should you call for help? Call your doctor now or seek immediate medical care if:    · You noticed that your baby has stopped moving or is moving much less than normal.    Watch closely for changes in your health, and be sure to contact your doctor if you have any problems. Where can you learn more? Go to http://mary-nuria.info/. Enter G873 in the search box to learn more about \"Counting Your Baby's Kicks: Care Instructions. \"  Current as of: November 21, 2017  Content Version: 11.7  © 4146-1044 [employer]. Care instructions adapted under license by [employer] (which disclaims liability or warranty for this information). If you have questions about a medical condition or this instruction, always ask your healthcare professional. [place] [address] any warranty or liability for your use of this information.

## 2018-07-24 ENCOUNTER — ROUTINE PRENATAL (OUTPATIENT)
Dept: OBGYN CLINIC | Age: 38
End: 2018-07-24

## 2018-07-24 VITALS
DIASTOLIC BLOOD PRESSURE: 74 MMHG | SYSTOLIC BLOOD PRESSURE: 124 MMHG | WEIGHT: 172.13 LBS | BODY MASS INDEX: 27.78 KG/M2

## 2018-07-24 DIAGNOSIS — O09.521 ELDERLY MULTIGRAVIDA IN FIRST TRIMESTER: Primary | ICD-10-CM

## 2018-07-24 DIAGNOSIS — N89.8 VAGINAL DISCHARGE DURING PREGNANCY, ANTEPARTUM: ICD-10-CM

## 2018-07-24 DIAGNOSIS — O26.899 VAGINAL DISCHARGE DURING PREGNANCY, ANTEPARTUM: ICD-10-CM

## 2018-07-24 LAB
FERN TEST, (POC): NORMAL
WET MOUNT POCT, WMPOCT: NORMAL

## 2018-07-24 NOTE — PROGRESS NOTES
[facility]  [email or web address]  [phone]    Yosvany Jin MD, FACOG     Follow-up OB visit    Chief Complaint   Patient presents with    Routine Prenatal Visit       Vitals:    18 1500   BP: 124/74   Weight: 172 lb 2 oz (78.1 kg)       Patient Active Problem List    Diagnosis Date Noted    Gestational diabetes mellitus (GDM) in childbirth, diet controlled 2018    Elderly multigravida in first trimester 2018        The patient reports the following concerns: Inc vd        See PN flowsheet for exam    41 y.o.  39w5d   Encounter Diagnoses   Name Primary?  Gestational diabetes mellitus (GDM) in childbirth, diet controlled     Vaginal discharge during pregnancy, antepartum     Elderly multigravida in first trimester Yes     IOL ho  fmc bid  R/s MFM fu, missed fri  Rom/labor prec   [] SAB/bleeding precautions reviewed   [] PTL/PPROM precautions reviewed   [] Labor precautions reviewed   [] Fetal kick counts discussed   [] Labs reviewed with patient   [] Bettie [place] precautions reviewed   [] Consent reviewed   [] Handouts given to pt   [] Glucola handout    [] GBS/labor/Magic Hour handout   []    [] We reviewed CDC recommendations for Tdap for patient and close contacts and RBA of receiving in pregnancy, advised obtaining in third trimester   [] Reviewed healthy nutrition in pregnancy and good exercise practices   [] We disc safer medications in pregnancy and referred patient to [facility] resources   [] We reviewed CDC recommendations for flu vaccine and RBA of receiving in pregnancy   []    []    []         Follow-up Disposition:  Return in about 1 week (around 2018) for Follow up OB visit.     Orders Placed This Encounter    AMB POC WET PREP (AKA STAIN, INTERPRET, WET MOUNT)    ELDON Stein MD

## 2018-07-24 NOTE — PATIENT INSTRUCTIONS
Week 39 of Your Pregnancy: Care Instructions  Your Care Instructions    During these final weeks, you may feel anxious to see your new baby. [place] babies often look different from what you see in pictures or movies. Right after birth, their heads may have a strange shape. Their eyes may be puffy. And their genitals may be swollen. They may also have very dry skin, or red marks on the eyelids, nose, or neck. Still, most parents think their babies are beautiful. Follow-up care is a key part of your treatment and safety. Be sure to make and go to all appointments, and call your doctor if you are having problems. It's also a good idea to know your test results and keep a list of the medicines you take. How can you care for yourself at home? Prepare to breastfeed  · If you are breastfeeding, continue to eat healthy foods. · Avoid alcohol, cigarettes, and drugs. This includes prescription and over-the-counter medicines. · You can help prevent sore nipples if you feed your baby in the correct position. Nurses will help you learn to do this. · Your  will need to be fed about every 1½ to 3 hours. Choose the right birth control after your baby is born  · Women who are breastfeeding can still get pregnant. Use birth control if you don't want to get pregnant. · Intrauterine devices (IUDs) work for women who want to wait at least 2 years before getting pregnant again. They are safe to use while you are breastfeeding. · Depo-Provera can be used while you are breastfeeding. It is a shot you get every 3 months. · Birth control pills work well. But you need a different kind of pill while you are breastfeeding. And when you start taking these pills, you need to make sure to use another type of birth control until you start your second pack. · Diaphragms, cervical caps, tubal implants, and condoms with spermicide work less well after birth.  If you have a diaphragm or cervical cap, you will need to have it refitted. · Tubal ligation (tying your tubes) and vasectomy are both permanent. These are good options if you are sure you are done having children. Where can you learn more? Go to http://mary-nuria.info/. Enter P588 in the search box to learn more about \"Week 39 of Your Pregnancy: Care Instructions. \"  Current as of: November 21, 2017  Content Version: 11.7  © 8673-0036 "[employer]. Care instructions adapted under license by [employer]aledMedia (which disclaims liability or warranty for this information). If you have questions about a medical condition or this instruction, always ask your healthcare professional. [address] any warranty or liability for your use of this information.

## 2018-07-24 NOTE — MR AVS SNAPSHOT
[address] [facility] 
[phone] Patient: Tete Aranda MRN: [id number] OPO:1/7/0790 Visit Information Date & Time Provider Department Dept. Phone Encounter #  
 7/24/2018  2:50 PM MD Guillermo Zepeda [phone] [id number] Your Appointments 8/1/2018  7:00 AM  
PROCEDURE with MD Guillermo Zepeda ([facility]) Appt Note: Induction [address] [place] [address]  
[phone]  
  
   
 [address] [address]  
  
    
  
 7/31/2018  2:50 PM  
OB VISIT with MD Guillermo Zepeda ([facility]) Appt Note: fob kim  ok per TP  
 [address] [facility] [address] [facility] [phone]  
[facility] [address] Upcoming Health Maintenance Date Due Influenza Age 5 to Adult 8/1/2018 PAP AKA CERVICAL CYTOLOGY 11/14/2019 Allergies as of 7/24/2018  Review Complete On: 7/24/2018 By: Emily Brooks LPN No Known Allergies Current Immunizations  Never Reviewed Name Date Tdap 5/21/2018 Not reviewed this visit Vitals BP Weight(growth percentile) LMP BMI OB Status Smoking Status 124/74 172 lb 2 oz (78.1 kg) 10/25/2017 (Exact Date) 27.78 kg/m2 Pregnant Never Smoker BMI and BSA Data Body Mass Index Body Surface Area  
 27.78 kg/m 2 1.91 m 2 Your Updated Medication List  
  
   
This list is accurate as of 7/24/18  3:10 PM.  Always use your most recent med list.  
  
  
  
  
 PRENATAL VITAMIN PO Take  by mouth. Patient Instructions Week 39 of Your Pregnancy: Care Instructions Your Care Instructions During these final weeks, you may feel anxious to see your new baby. [place] babies often look different from what you see in pictures or movies. Right after birth, their heads may have a strange shape. Their eyes may be puffy. And their genitals may be swollen. They may also have very dry skin, or red marks on the eyelids, nose, or neck. Still, most parents think their babies are beautiful. Follow-up care is a key part of your treatment and safety. Be sure to make and go to all appointments, and call your doctor if you are having problems. It's also a good idea to know your test results and keep a list of the medicines you take. How can you care for yourself at home? Prepare to breastfeed · If you are breastfeeding, continue to eat healthy foods. · Avoid alcohol, cigarettes, and drugs. This includes prescription and over-the-counter medicines. · You can help prevent sore nipples if you feed your baby in the correct position. Nurses will help you learn to do this. · Your  will need to be fed about every 1½ to 3 hours. Choose the right birth control after your baby is born · Women who are breastfeeding can still get pregnant. Use birth control if you don't want to get pregnant. · Intrauterine devices (IUDs) work for women who want to wait at least 2 years before getting pregnant again. They are safe to use while you are breastfeeding. · Depo-Provera can be used while you are breastfeeding. It is a shot you get every 3 months. · Birth control pills work well. But you need a different kind of pill while you are breastfeeding. And when you start taking these pills, you need to make sure to use another type of birth control until you start your second pack. · Diaphragms, cervical caps, tubal implants, and condoms with spermicide work less well after birth. If you have a diaphragm or cervical cap, you will need to have it refitted. · Tubal ligation (tying your tubes) and vasectomy are both permanent. These are good options if you are sure you are done having children. Where can you learn more? Go to http://mary-nuria.info/. Enter S847 in the search box to learn more about \"Week 39 of Your Pregnancy: Care Instructions. \" Current as of: November 21, 2017 Content Version: 11.7 © 4936-6911 [employer]Engine. Care instructions adapted under license by Tokop[employer] (which disclaims liability or warranty for this information). If you have questions about a medical condition or this instruction, always ask your healthcare professional. [address] any warranty or liability for your use of this information. Please provide this summary of care documentation to your next provider. Your primary care clinician is listed as Phys Other. If you have any questions after today's visit, please call [phone].

## 2018-07-24 NOTE — PROGRESS NOTES
[address] [facility]  [email or web address]  [phone]    Sun Perez MD, FACOG       OB/GYN: [facility] Problem visit    Chief Complaint:   Chief Complaint   Patient presents with    Routine Prenatal Visit       Patient Active Problem List    Diagnosis    Gestational diabetes mellitus (GDM) in childbirth, diet controlled    Elderly multigravida in first trimester     Panorama testing next visit WITH gender: declined 18  Needs to sign testing consents at 16 week visit. Declines genetic screening/testing/AFP  20 wk FS w/ genetic counseling  Ant plac :LLP vs previa: has MFM fu US  1hr GTT- 142 (H) 3 hr gtt- #69, #209, #194, #134  GDM diet controlled. (MFM rec delivery 41 wks if well controlled)  [facility] Dr. Ela Yañez on 18 at 10:00am, DTC request sent, MFM notified  Induction 18. Pt notified. Instructions mailed. History of Present Illness: The patient is a 41 y.o.  female who reports having vaginal discharge and bleeding for a few days. This is a new problem. This is a routinely scheduled OB appointment. She reports the symptoms are is unchanged. Aggravating factors include none. Alleviating factors include none. She does not have other concerns. PFSH:  Past Medical History:   Diagnosis Date    Papanicolaou smear for cervical cancer screening 2016    Neg pap and neg HPV     History reviewed. No pertinent surgical history. Family History   Problem Relation Age of Onset    Family history unknown: Yes     Social History   Substance Use Topics    Smoking status: Never Smoker    Smokeless tobacco: Never Used    Alcohol use No     No Known Allergies  Current Outpatient Prescriptions   Medication Sig    PRENATAL VIT CALC,IRON,FOLIC (PRENATAL VITAMIN PO) Take  by mouth. No current facility-administered medications for this visit.         Review of Systems:  History obtained from the patient and written ROS questionnaire  Constitutional: negative for fevers, chills and weight loss  ENT ROS: negative for - hearing change, oral lesions or visual changes  Respiratory: negative for cough, wheezing or dyspnea on exertion  Cardiovascular: negative for chest pain, irregular heart beats, exertional chest pressure/discomfort  Gastrointestinal: negative for dysphagia, nausea and vomiting  Genito-Urinary ROS: inc VD, see HPI  Inteument/breast: negative for rash, breast lump and nipple discharge  Musculoskeletal:negative for stiff joints, neck pain and muscle weakness  Endocrine ROS: negative for - breast changes, galactorrhea or temperature intolerance  Hematological and Lymphatic ROS: negative for - blood clots, bruising or swollen lymph nodes    Physical Exam:  Visit Vitals    /74    Wt 172 lb 2 oz (78.1 kg)    BMI 27.78 kg/m2       GENERAL: alert, well appearing, and in no distress  HEAD; normocephalic, atraumatic2  ABDOMEN: soft, nontender, nondistended, no masses or organomegaly   BACK: normal range of motion, no tenderness, no CVAT   EGBUS: no lesions, no inflammation, no masses  VULVA: normal appearing vulva with no masses, tenderness or lesions  VAGINA: normal appearing vagina with normal color, no lesions, white thin discharge  Neg ntz  No pooling  CERVIX: normal appearing cervix without discharge or lesions, non tender  UTERUS: uterus is enlarged in size, gravid appropriate for gestational age  ADNEXA: normal adnexa in size, nontender and no masses  NEURO: alert, oriented, normal speech    See PN flowsheet for additional notes and exam    Assessment:  41 y.o.  39w5d   Encounter Diagnoses   Name Primary?  Elderly multigravida in first trimester Yes    Gestational diabetes mellitus (GDM) in childbirth, diet controlled     Vaginal discharge during pregnancy, antepartum        Plan:  An evaluation of this patient's concern is planned.   The patient is advised that she should contact the office if she does not note improvement or if symptoms recur  She should contact our office with any questions or concerns  She could keep her routine OB appointment.    Labor prec  See other note    Orders Placed This Encounter    AMB POC WET PREP (AKA STAIN, INTERPRET, WET MOUNT)    AMB POC FERN TEST       Results for orders placed or performed in visit on 07/24/18   AMB POC FERN TEST   Result Value Ref Range    FERN test, (POC)      Narrative    No marlene Perez MD

## 2018-07-25 ENCOUNTER — HOSPITAL ENCOUNTER (OUTPATIENT)
Dept: PERINATAL CARE | Age: 38
Discharge: HOME | End: 2018-07-25
Attending: OBSTETRICS & GYNECOLOGY
Payer: COMMERCIAL

## 2018-07-25 PROCEDURE — 76818 FETAL BIOPHYS PROFILE W/NST: CPT | Performed by: OBSTETRICS & GYNECOLOGY

## 2018-07-27 ENCOUNTER — HOSPITAL ENCOUNTER (INPATIENT)
Age: 38
LOS: 4 days | Discharge: HOME | End: 2018-08-01
Attending: OBSTETRICS & GYNECOLOGY | Admitting: OBSTETRICS & GYNECOLOGY
Payer: COMMERCIAL

## 2018-07-27 DIAGNOSIS — Z98.891 S/P C-SECTION: Primary | ICD-10-CM

## 2018-07-27 PROBLEM — Z37.9 NORMAL LABOR: Status: ACTIVE | Noted: 2018-07-27

## 2018-07-27 PROCEDURE — 59025 FETAL NON-STRESS TEST: CPT

## 2018-07-27 PROCEDURE — 75410000002 HC LABOR FEE PER 1 HR: Performed by: OBSTETRICS & GYNECOLOGY

## 2018-07-27 PROCEDURE — 77010026064 HC OXYGEN INFANT MED AIR MIN: Performed by: OBSTETRICS & GYNECOLOGY

## 2018-07-27 NOTE — IP AVS SNAPSHOT
[address] [facility] 104 70 [facility] 
[phone] Patient: Becca Abad MRN: [id number] TUB:9/3/6343 A check jimmie indicates which time of day the medication should be taken. My Medications START taking these medications Instructions Each Dose to Equal  
 Morning Noon Evening Bedtime HYDROcodone-acetaminophen 5-325 mg per tablet Commonly known as:  Jaclyn Fothergill Take 1 Tab by mouth every four (4) hours as needed for Pain. Max Daily Amount: 6 Tabs. 1 Tab  
    
   
   
   
  
 ibuprofen 600 mg tablet Commonly known as:  MOTRIN Take 1 Tab by mouth every six (6) hours as needed for Pain. Take with food. 600 mg Next dose of Motrin 600 mg at [facility] taking these medications Instructions Each Dose to Equal  
 Morning Noon Evening Bedtime PRENATAL VITAMIN PO Take  by mouth. Begin taking this vitamin tomorrow morning on 8/2/18 Where to Get Your Medications Information on where to get these meds will be given to you by the nurse or doctor. ! Ask your nurse or doctor about these medications HYDROcodone-acetaminophen 5-325 mg per tablet  
 ibuprofen 600 mg tablet

## 2018-07-27 NOTE — IP AVS SNAPSHOT
Ana Yoder 
 
 
 [address] [facility] 
[phone] Patient: Obed Babin MRN: [id number] NBU:2/3/7361 About your hospitalization You were admitted on:  July 27, 2018 You last received care in the:  [facility] 3 [facility] You were discharged on:  August 1, 2018 Why you were hospitalized Your primary diagnosis was:  Not on File Your diagnoses also included:  Normal Labor, Normal Labor And Delivery Follow-up Information Follow up With Details Comments Contact Info Gildardo Brandt MD In 6 weeks Postpartum visit [address] [facility] [address] [facility] 
[phone] Alexandria Womack MD   Patient can only remember the practice name and not the physician Discharge Orders None A check jimmie indicates which time of day the medication should be taken. My Medications START taking these medications Instructions Each Dose to Equal  
 Morning Noon Evening Bedtime HYDROcodone-acetaminophen 5-325 mg per tablet Commonly known as:  Nelma Carolyn Take 1 Tab by mouth every four (4) hours as needed for Pain. Max Daily Amount: 6 Tabs. 1 Tab  
    
   
   
   
  
 ibuprofen 600 mg tablet Commonly known as:  MOTRIN Take 1 Tab by mouth every six (6) hours as needed for Pain. Take with food. 600 mg Next dose of Motrin [facility] these medications Instructions Each Dose to Equal  
 Morning Noon Evening Bedtime PRENATAL VITAMIN PO Take  by mouth. Begin taking this vitamin tomorrow morning on 8/2/18 Where to Get Your Medications Information on where to get these meds will be given to you by the nurse or doctor. ! Ask your nurse or doctor about these medications HYDROcodone-acetaminophen 5-325 mg per tablet  
 ibuprofen 600 mg tablet Opioid Education Prescription Opioids: What You Need to Know: 
 
 
POST DELIVERY DISCHARGE INSTRUCTIONS 
FROM YOUR PHYSICIAN Name: Randee Wayne YOB: 1980 General:  
 
Read all discharge information provided by the hospital 
 
Diet/Diet Restrictions: 
Eat healthy meals and snacks as desired. Eat foods that are high in fiber and low in fat and cholesterol. Drink eight 8-ounce glasses of water daily; avoid excessive caffeine intake. [email or web address]-cullen.[email or web address]/. html 
[email or web address] Medications:  
See discharge medication list and read instructions carefully. Breast Feeding: 
See instructions from your lactation consult. Call [phone] for more information or to locate a lactation consultant. https://www.alas.info/ Vaccines: If you received the MMR vaccine postpartum you should wait three months until you get pregnant again. You, and close contacts, should make sure that the Tdap vaccine is up to date. This vaccine can decrease the risk of your baby getting pertussis or \"whooping cough. \" You, and close contacts, should receive the influenza vaccine during flu season when appropriate. SalaryStart.tn Tobacco Use: If you (or other people around the baby) smoke or use tobacco products, please try to  use and quit to improve your health and decrease risk to your baby. LimitBuy.nl. htm Swelling in your Legs: 
There are many fluid changes after delivery and you may have more swelling the first few days after delivery  Continue to drink plenty of water, avoid sitting or standing in one position for too long and elevate your feet above your heart, to help reduce some the pressure you may be feeling in your ankles and legs.  Section Incision: 
Steri-strips or tape strips may be removed gently at home approximately 7- 10 days after surgery. Soaking the strips with a warm, wet cloth or taking a shower may make the strips easier to remove. Metal staples are usually removed within 3 to 10 days, either before you leave the hospital or in the office. Make an appointment if needed. Insorb absorbable staples may be used under the skin but you may see small white pieces as they dissolve. Skin glue or dermabond will fall off with time. Abdominal incisions should be kept clean by showering. It is not necessary to put soap on the incision; plain tap water is adequate. Avoid scrubbing the area and pat dry. The way your scar looks will change over time and may not reach its final appearance for up to a year. The area may feel either numb or sensitive to touch, which is normal. 
 
    
Physical Activity / Restrictions / Safety:  
 
Avoid heavy lifting, no more that 10 pounds, for 2-3 weeks. No driving while taking narcotic pain medication, of if you can not slam on the brakes. No intercourse for 4-6 weeks, no douching or tampon use until seen by your doctor for your postpartum visit. Use condoms as needed for contraception with sexual activity. You may resume normal exercise after you are cleared by your physician at your postpartum check. You may walk for exercise, as tolerated. Discharge Instructions/Special Treatment/Home Care Needs:  
 
Continue your prenatal vitamins while breast feeding or pumping. Continue to use a squirt bottle with warm water on your perineum/bottom/episiotomy after each bathroom use until bleeding stops. Take stool softeners daily. For example, docusate over the counter stool softener. This is especially important if you are taking narcotic pain medications, because they can cause constipation. Call your doctor for the following: If you have a fever over 100.4 degrees by mouth on two readings. If you have persistent vaginal bleeding heavier than a heavy menstrual period or persistent large clots or if you are bleeding so heavy it is making you feel weak. It is normal to pass larger clots when you first get out of bed: but if they persist, notify your physician. If you have red streaks or increased swelling of legs, painful red streaks on your breast. 
If you have painful urination, or increased pain, redness or discharge with your incision. If you have any questions or concerns. Pain Management:  
 
Take Acetaminophen (Tylenol), Ibuprofen (Advil, Motrin), prescribed pain medications as directed for pain. Do not take Perocet with Tylenol, they both contain acetaminophen. Use a warm water Sitz bath 3 times daily to relieve episiotomy, bottom/perineum or hemorrhoidal discomfort. Apply heating pad to  incision as needed. For hemorrhoidal discomfort, you can use Tucks and Anusol cream as needed and directed. NSAID information for patients: 
[email or web address] Pain medication/narcotic information for patients:  Take your medicine exactly as prescribed  Store your medicine away from children and in a safe 
place  Do not give your medicine to others  Do not drink alcohol while taking this medicine Follow-Up Care:  
 
Appointment with MD: 
Dr. Margarito Guajardo [phone] Schedule your postpartum visit for six weeks Additional Discharge Instructions Please read all of your discharge instructions Follow all of your medication instructions carefully Call our office on the next business day to schedule your follow-up appointment If you have any questions or concerns, please contact us at [phone] or if the situation is urgent contact [phone] Become a [facility] My Chart user so you can access information, results and appointments: go to [email or web address]. The [facility] [address] is to bring compassion to healthcare and to be good help to those in need. We aim at providing quality healthcare with an emphasis on respect, justice, compassion, stewardship, integrity, growth and innovation. If you did not receive excellent communication, compassionate care and an outstanding patient experience, please notify [employer] at [email or web address] or [phone] or discuss your concerns with me at your next visit so that we can meet our mission and your expectations Scar Minaya MD 
[address], [address] 
[email or web address]  
[phone] Good Help to Those in Need® [facility] Announcement We are excited to announce that we are making your provider's discharge notes available to you in [employer] [facility]. You will see these notes when they are completed and signed by the physician that discharged you from your recent hospital stay. If you have any questions or concerns about any information you see in [facility], please call the Health Information Department where you were seen or reach out to your Primary Care Provider for more information about your plan of care. Introducing Matthew Sams As a [facility] patient, I wanted to make you aware of our electronic visit tool called Matthew Sams. [facility] 24/7 allows you to connect within minutes with a medical provider 24 hours a day, seven days a week via a mobile device or tablet or logging into a secure website from your computer. You can access [employer] from anywhere in the [place]. A virtual visit might be right for you when you have a simple condition and feel like you just dont want to get out of bed, or cant get away from work for an appointment, when your regular [facility] provider is not available (evenings, weekends or holidays), or when youre out of town and need minor care. Electronic visits cost only $49 and if the [facility] 24/7 provider determines a prescription is needed to treat your condition, one can be electronically transmitted to a nearby pharmacy*. Please take a moment to enroll today if you have not already done so. The enrollment process is free and takes just a few minutes. To enroll, please download the Mary Drake 24/Merlin Diamonds felipe to your tablet or phone, or visit [email or web address] to enroll on your computer. And, as an [address] [place] [address] patient with a UserApp account, the results of your visits will be scanned into your electronic medical record and your primary care provider will be able to view the scanned results. We urge you to continue to see your regular [facility] provider for your ongoing medical care. And while your primary care provider may not be the one available when you seek a Matthew Vacacatherinefin virtual visit, the peace of mind you get from getting a real diagnosis real time can be priceless. For more information on Tagitocatherinefin, view our Frequently Asked Questions (FAQs) at [email or web address]. Sincerely, 
 
Ilan Real MD 
Chief Medical Officer 8 Anastasia Banda *:  certain medications cannot be prescribed via Tagitotashi Providers Seen During Your Hospitalization Provider Specialty Primary office phone Sula Sandifer, MD Obstetrics & Gynecology [phone] Your Primary Care Physician (PCP) Primary Care Physician Office Phone Office Fax OTHER, PHYS ** None ** ** None ** You are allergic to the following No active allergies Recent Documentation Height Weight Breastfeeding? BMI OB Status Smoking Status 1.676 m 78 kg Unknown 27.76 kg/m2 Recent pregnancy Never Smoker Emergency Contacts Name Discharge Info Relation Home Work Mobile IdaliaHelene vicente DISCHARGE CAREGIVER [3] Boyfriend [17] [phone] Mert Potter  Other Relative [6] [phone] Patient Belongings The following personal items are in your possession at time of discharge: 
  Dental Appliances: None         Home Medications: None   Jewelry: None  Clothing: At bedside    Other Valuables: None Please provide this summary of care documentation to your next provider. Signatures-by signing, you are acknowledging that this After Visit Summary has been reviewed with you and you have received a copy. Patient Signature:  ____________________________________________________________ Date:  ____________________________________________________________  
  
Broderick Milan Provider Signature:  ____________________________________________________________ Date:  ____________________________________________________________

## 2018-07-28 ENCOUNTER — ANESTHESIA (OUTPATIENT)
Dept: LABOR AND DELIVERY | Age: 38
End: 2018-07-28
Payer: COMMERCIAL

## 2018-07-28 ENCOUNTER — ANESTHESIA EVENT (OUTPATIENT)
Dept: LABOR AND DELIVERY | Age: 38
End: 2018-07-28
Payer: COMMERCIAL

## 2018-07-28 LAB
BASOPHILS # BLD: 0 K/UL (ref 0–0.1)
BASOPHILS NFR BLD: 0 % (ref 0–1)
DIFFERENTIAL METHOD BLD: NORMAL
EOSINOPHIL # BLD: 0.2 K/UL (ref 0–0.4)
EOSINOPHIL NFR BLD: 2 % (ref 0–7)
ERYTHROCYTE [DISTWIDTH] IN BLOOD BY AUTOMATED COUNT: 13.2 % (ref 11.5–14.5)
GLUCOSE BLD STRIP.AUTO-MCNC: 67 MG/DL (ref 65–100)
GLUCOSE BLD STRIP.AUTO-MCNC: 70 MG/DL (ref 65–100)
GLUCOSE BLD STRIP.AUTO-MCNC: 84 MG/DL (ref 65–100)
GLUCOSE BLD STRIP.AUTO-MCNC: 97 MG/DL (ref 65–100)
HCT VFR BLD AUTO: 39.7 % (ref 35–47)
HGB BLD-MCNC: 12.9 G/DL (ref 11.5–16)
IMM GRANULOCYTES # BLD: 0 K/UL (ref 0–0.04)
IMM GRANULOCYTES NFR BLD AUTO: 0 % (ref 0–0.5)
LYMPHOCYTES # BLD: 1.7 K/UL (ref 0.8–3.5)
LYMPHOCYTES NFR BLD: 18 % (ref 12–49)
MCH RBC QN AUTO: 29.4 PG (ref 26–34)
MCHC RBC AUTO-ENTMCNC: 32.5 G/DL (ref 30–36.5)
MCV RBC AUTO: 90.4 FL (ref 80–99)
MONOCYTES # BLD: 0.7 K/UL (ref 0–1)
MONOCYTES NFR BLD: 7 % (ref 5–13)
NEUTS SEG # BLD: 6.9 K/UL (ref 1.8–8)
NEUTS SEG NFR BLD: 72 % (ref 32–75)
NRBC # BLD: 0 K/UL (ref 0–0.01)
NRBC BLD-RTO: 0 PER 100 WBC
PLATELET # BLD AUTO: 174 K/UL (ref 150–400)
PMV BLD AUTO: 11.6 FL (ref 8.9–12.9)
RBC # BLD AUTO: 4.39 M/UL (ref 3.8–5.2)
SERVICE CMNT-IMP: NORMAL
WBC # BLD AUTO: 9.5 K/UL (ref 3.6–11)

## 2018-07-28 PROCEDURE — 65270000029 HC RM PRIVATE

## 2018-07-28 PROCEDURE — 74011250637 HC RX REV CODE- 250/637: Performed by: OBSTETRICS & GYNECOLOGY

## 2018-07-28 PROCEDURE — 74011250636 HC RX REV CODE- 250/636

## 2018-07-28 PROCEDURE — 77030011943

## 2018-07-28 PROCEDURE — 74011250636 HC RX REV CODE- 250/636: Performed by: OBSTETRICS & GYNECOLOGY

## 2018-07-28 PROCEDURE — 10907ZC DRAINAGE OF AMNIOTIC FLUID, THERAPEUTIC FROM PRODUCTS OF CONCEPTION, VIA NATURAL OR ARTIFICIAL OPENING: ICD-10-PCS | Performed by: OBSTETRICS & GYNECOLOGY

## 2018-07-28 PROCEDURE — 51701 INSERT BLADDER CATHETER: CPT

## 2018-07-28 PROCEDURE — 77030014125 HC TY EPDRL BBMI -B: Performed by: ANESTHESIOLOGY

## 2018-07-28 PROCEDURE — 10H07YZ INSERTION OF OTHER DEVICE INTO PRODUCTS OF CONCEPTION, VIA NATURAL OR ARTIFICIAL OPENING: ICD-10-PCS | Performed by: OBSTETRICS & GYNECOLOGY

## 2018-07-28 PROCEDURE — 74011000258 HC RX REV CODE- 258: Performed by: OBSTETRICS & GYNECOLOGY

## 2018-07-28 PROCEDURE — 99218 HC RM OBSERVATION: CPT

## 2018-07-28 PROCEDURE — 4A0HXCZ MEASUREMENT OF PRODUCTS OF CONCEPTION, CARDIAC RATE, EXTERNAL APPROACH: ICD-10-PCS | Performed by: OBSTETRICS & GYNECOLOGY

## 2018-07-28 PROCEDURE — 74011250636 HC RX REV CODE- 250/636: Performed by: ANESTHESIOLOGY

## 2018-07-28 PROCEDURE — 36415 COLL VENOUS BLD VENIPUNCTURE: CPT | Performed by: OBSTETRICS & GYNECOLOGY

## 2018-07-28 PROCEDURE — 85025 COMPLETE CBC W/AUTO DIFF WBC: CPT | Performed by: OBSTETRICS & GYNECOLOGY

## 2018-07-28 PROCEDURE — 74011000250 HC RX REV CODE- 250

## 2018-07-28 PROCEDURE — 77030034850

## 2018-07-28 PROCEDURE — 75410000002 HC LABOR FEE PER 1 HR: Performed by: OBSTETRICS & GYNECOLOGY

## 2018-07-28 PROCEDURE — 82962 GLUCOSE BLOOD TEST: CPT

## 2018-07-28 RX ORDER — SODIUM CHLORIDE, SODIUM LACTATE, POTASSIUM CHLORIDE, CALCIUM CHLORIDE 600; 310; 30; 20 MG/100ML; MG/100ML; MG/100ML; MG/100ML
125 INJECTION, SOLUTION INTRAVENOUS CONTINUOUS
Status: DISCONTINUED | OUTPATIENT
Start: 2018-07-28 | End: 2018-08-01 | Stop reason: HOSPADM

## 2018-07-28 RX ORDER — NALOXONE HYDROCHLORIDE 0.4 MG/ML
0.4 INJECTION, SOLUTION INTRAMUSCULAR; INTRAVENOUS; SUBCUTANEOUS AS NEEDED
Status: DISCONTINUED | OUTPATIENT
Start: 2018-07-28 | End: 2018-07-29 | Stop reason: HOSPADM

## 2018-07-28 RX ORDER — FENTANYL/BUPIVACAINE/NS/PF 2-1250MCG
1-16 PREFILLED PUMP RESERVOIR EPIDURAL CONTINUOUS
Status: DISCONTINUED | OUTPATIENT
Start: 2018-07-28 | End: 2018-07-29 | Stop reason: HOSPADM

## 2018-07-28 RX ORDER — EPHEDRINE SULFATE 50 MG/ML
10 INJECTION, SOLUTION INTRAVENOUS
Status: DISCONTINUED | OUTPATIENT
Start: 2018-07-28 | End: 2018-07-29 | Stop reason: HOSPADM

## 2018-07-28 RX ORDER — MAG HYDROX/ALUMINUM HYD/SIMETH 200-200-20
30 SUSPENSION, ORAL (FINAL DOSE FORM) ORAL
Status: DISCONTINUED | OUTPATIENT
Start: 2018-07-28 | End: 2018-07-29 | Stop reason: HOSPADM

## 2018-07-28 RX ORDER — SODIUM CHLORIDE, SODIUM LACTATE, POTASSIUM CHLORIDE, CALCIUM CHLORIDE 600; 310; 30; 20 MG/100ML; MG/100ML; MG/100ML; MG/100ML
999 INJECTION, SOLUTION INTRAVENOUS ONCE
Status: COMPLETED | OUTPATIENT
Start: 2018-07-28 | End: 2018-07-28

## 2018-07-28 RX ORDER — ACETAMINOPHEN 500 MG
1000 TABLET ORAL ONCE
Status: COMPLETED | OUTPATIENT
Start: 2018-07-28 | End: 2018-07-28

## 2018-07-28 RX ORDER — OXYTOCIN IN 5 % DEXTROSE 30/500 ML
1-25 PLASTIC BAG, INJECTION (ML) INTRAVENOUS
Status: DISCONTINUED | OUTPATIENT
Start: 2018-07-28 | End: 2018-08-01 | Stop reason: HOSPADM

## 2018-07-28 RX ADMIN — Medication 7 MILLI-UNITS/MIN: at 19:47

## 2018-07-28 RX ADMIN — SODIUM CHLORIDE, SODIUM LACTATE, POTASSIUM CHLORIDE, AND CALCIUM CHLORIDE 125 ML/HR: 600; 310; 30; 20 INJECTION, SOLUTION INTRAVENOUS at 13:11

## 2018-07-28 RX ADMIN — SODIUM CHLORIDE, SODIUM LACTATE, POTASSIUM CHLORIDE, AND CALCIUM CHLORIDE 1000 ML: 600; 310; 30; 20 INJECTION, SOLUTION INTRAVENOUS at 11:00

## 2018-07-28 RX ADMIN — PENICILLIN G POTASSIUM 2.5 MILLION UNITS: 20000000 POWDER, FOR SOLUTION INTRAVENOUS at 21:37

## 2018-07-28 RX ADMIN — Medication 5 MILLI-UNITS/MIN: at 19:49

## 2018-07-28 RX ADMIN — Medication 12 ML/HR: at 19:21

## 2018-07-28 RX ADMIN — SODIUM CHLORIDE 5 MILLION UNITS: 900 INJECTION, SOLUTION INTRAVENOUS at 05:00

## 2018-07-28 RX ADMIN — Medication 12 ML/HR: at 11:54

## 2018-07-28 RX ADMIN — GENTAMICIN SULFATE 156 MG: 40 INJECTION, SOLUTION INTRAMUSCULAR; INTRAVENOUS at 22:25

## 2018-07-28 RX ADMIN — PENICILLIN G POTASSIUM 2.5 MILLION UNITS: 20000000 POWDER, FOR SOLUTION INTRAVENOUS at 14:06

## 2018-07-28 RX ADMIN — SODIUM CHLORIDE, SODIUM LACTATE, POTASSIUM CHLORIDE, AND CALCIUM CHLORIDE 999 ML/HR: 600; 310; 30; 20 INJECTION, SOLUTION INTRAVENOUS at 01:13

## 2018-07-28 RX ADMIN — PENICILLIN G POTASSIUM 2.5 MILLION UNITS: 20000000 POWDER, FOR SOLUTION INTRAVENOUS at 10:20

## 2018-07-28 RX ADMIN — ACETAMINOPHEN 1000 MG: 500 TABLET ORAL at 21:24

## 2018-07-28 RX ADMIN — PENICILLIN G POTASSIUM 2.5 MILLION UNITS: 20000000 POWDER, FOR SOLUTION INTRAVENOUS at 18:07

## 2018-07-28 RX ADMIN — Medication 2 MILLI-UNITS/MIN: at 13:11

## 2018-07-28 NOTE — PROGRESS NOTES
1539 SBAR report received from Ashley Marquez Dr and transfer of care accepted at this time. 1920 Bedside glucose performed at this time. BS 70. Pt given juice to drink.  Assessment completed. Pt readjusted to R lateral at this time. .  
 
2100 Pt repositioned to R lateral trendelenburg with peanut ball placed between pt legs and pillows placed behind pt back. SVE performed at this time 7/0.  
 
211 Dr Jessica Quinn at bedside to evaluate pt at this time and notified of pt cervical exam of 0 with increasing baseline and oral temp of 100.9. Orders given to give pt 1gm tylenol PO and start gentamycin 100mg IVBP now and every 8hrs. 2130 Pt given 1 gm tylenol PO at this time. 2140 2.5million units of PCN hung at this time. 2200 Pt readjusted to L lateral trendelenburg with peanut ball placed between pt legs and pillow placed behind pt back at this time. Gentamycin 100mg hung at this time IVPB. 2300 Blood glucose performed. BS 96. 
 
0015 Dr Jessica Quinn notified of pt persistent anterior lip. Orders received to give pt 25mg IV benadryl for swelling at this time. 0040 PCN 2.5million units hung, benadryl 25mg given IVP at this time. 0115 SVE performed anterior lip remains. Pt readjusted to R lateral trendelenburg with peanut ball placed between pt legs and pillows placed behind pt back. 0300 /+1. Bedside glucose performed. BS 84. Pt readjusted to L lateral at this time. 4052 Dr Jessica Quinn at bedside to evaluate pt. SVE performed. 9cm. Dr Jessica Quinn discussed with pt that pt cervix is still 9cm and she has been 9cm since . Dr Jessica Quinn is recommending  at this time. 0345 Pitocin stopped at this time. Pt requesting to have family member present to aid in translation. 6406 Pt cousin at bedside translating information to pt. Pt agrees to  at this time. IUPC removed. 0406 Perineum shaved and scrubbed with chlorhexidine wipes at this time.  Anesthesia notified of . 7606 Dr Cassandra Patterson at bedside to discuss anesthesia for  with pt at this time. 0191 Dr Cassandra Patterson dosed pt for  at this time. Pt transported via bed to OR. 
 
1951 Pt transported from OR back to [facility].

## 2018-07-28 NOTE — H&P
History & Physical 
 
Name: Jeffery Armstrong MRN: [id number]  SSN: [id number] YOB: 1980  Age: 41 y.o. Sex: female Subjective:  
 
Estimated Date of Delivery: 18 OB History  Para Term  AB Living 3 0 0 0 2 0 SAB TAB Ectopic Molar Multiple Live Births 2 0 0 0 0 0 Obstetric Comments  
 sab 2015, early Ms. Lisa Larry is admitted with pregnancy at 40w2d for active labor. Prenatal course was normal. Please see prenatal records for details. Past Medical History:  
Diagnosis Date  Gestational diabetes Diet controlled  Papanicolaou smear for cervical cancer screening 2016 Neg pap and neg HPV No past surgical history on file. Social History Occupational History  Not on file. Social History Main Topics  Smoking status: Never Smoker  Smokeless tobacco: Never Used  Alcohol use No  
 Drug use: No  
 Sexual activity: Yes  
  Partners: Male Birth control/ protection: None Family History Problem Relation Age of Onset  Hypertension Mother  Diabetes Father No Known Allergies Prior to Admission medications Medication Sig Start Date End Date Taking? Authorizing Provider PRENATAL VIT CALC,IRON,FOLIC (PRENATAL VITAMIN PO) Take  by mouth. Yes Historical Provider Review of Systems: A comprehensive review of systems was negative except for that written in the HPI. Objective:  
 
Vitals: 
Vitals:  
 18 2312 18 2313 18 2321 18 1260 BP:  123/78 Pulse:  73 Resp: 16   18 Temp: 98.1 °F (36.7 °C) 98 °F (36.7 °C)  98.2 °F (36.8 °C) Weight:   78 kg (172 lb) Height:   5' 6\" (1.676 m) Physical Exam: 
Patient without distress. Heart: Regular rate and rhythm Lung: clear to auscultation throughout lung fields, no wheezes, no rales, no rhonchi and normal respiratory effort Abdomen: soft, nontender Cervical Exam: 3 cm dilated 50% effaced Lower Extremities:  - Edema 1+ Membranes:  Intact Fetal Heart Rate: Reactive occasional variable deceleration Variability: moderate Prenatal Labs:  
Lab Results Component Value Date/Time  
 Rubella, External immune 12/23/2017 GrBStrep, External positive 07/04/2018 HBsAg, External Negative 12/23/2017 HIV, External Non reactive 12/23/2017 Gonorrhea, External Negative 12/23/2017 Chlamydia, External Negative 12/23/2017 Assessment/Plan:  
 
Plan: Admit for Labor  Continue expectant management, Continue plan for vaginal delivery. Group B Strep was positive, will treat prophylactically with penicillin. Signed By:  Lewis Benson MD   
 July 28, 2018

## 2018-07-28 NOTE — PROGRESS NOTES
0900:  Report received from Nii Guevara, [facility]. Pt is up to the bathroom. Will walk. 0915:  Fetal heart rate via doppler. 130's with no audible decel. Abdomen palpates soft. Pt to continue to walk. 1010: The pt placed back on the monitor, us and toco.  IV bolus started, pt requests epidural.  SVE 4/50/-2. The patient is tolerating contractions well.   
 
1020:  Dr. Andreea Dubois at the bedside to evaluate fetal monitor and discuss plan of care with the patient.

## 2018-07-28 NOTE — PROGRESS NOTES
The patient is comfortable with her epidural and is without complaints. Visit Vitals  /72 (BP 1 Location: Right arm, BP Patient Position: At rest)  Pulse 75  Temp 98.6 °F (37 °C)  Resp 18  Ht 5' 6\" (1.676 m)  Wt 78 kg (172 lb)  SpO2 99%  Breastfeeding Yes  BMI 27.76 kg/m2 FS 84 FHR: 135 moderate variability, accelerations present, no decels, cat 1 [place]: irregular contractions Sve: 4/70/-3 vtx, amniotomy- light meconium Ass/Plan:  at 40 2/7 wks in early labor, light meconium If inadequate contractions after 30 minutes of monitoring will start pitocin augmentation

## 2018-07-28 NOTE — ANESTHESIA PREPROCEDURE EVALUATION
Anesthetic History No history of anesthetic complications Review of Systems / Medical History Patient summary reviewed, nursing notes reviewed and pertinent labs reviewed Pulmonary Within defined limits Neuro/Psych Within defined limits Cardiovascular Within defined limits Exercise tolerance: >4 METS 
  
GI/Hepatic/Renal 
Within defined limits Endo/Other Diabetes (gestational DM) Other Findings Physical Exam 
 
Airway Mallampati: III 
TM Distance: > 6 cm Neck ROM: normal range of motion Mouth opening: Normal 
 
 Cardiovascular Regular rate and rhythm,  S1 and S2 normal,  no murmur, click, rub, or gallop Dental 
No notable dental hx Pulmonary Breath sounds clear to auscultation Abdominal 
GI exam deferred Other Findings Anesthetic Plan ASA: 2 Anesthesia type: CSE Anesthetic plan and risks discussed with: Patient

## 2018-07-28 NOTE — PROGRESS NOTES
2312 Pt arrived ambulatory from home with complaint of contractions every 5minutes. Pt is a  with hx of GDM diet controlled and GBS positive. Pt speaks and understands English but is using a friend to aid in interpretation of medical terms at this time. 0000 Dr Gene Pearson notified of pt arrival cervical status of 0/40/-3 with reactive NST and contractions every 8-10minutes at 40.1weeks gestation and pain 2-3/10. Orders given to continue to monitor pt for an hour and recheck pt cervix and call MD with results. 0033 Variable FHR deceleration into the 80s for 2minutes. Pt readjusted to R lateral at this time. IV started. 56 Dr Lewis Child notified of variable deceleration into the [de-identified] for 2minutes with return to baseline after pt readjusted to R lateral. IV bolus started. Orders given to keep pt for prolonged monitoring at this time. 0205 Pt ambulated to Br at this time.  with accelerations. No decelerations in FHR noted at this time. 0210 Pt back to bed . 
 
0415 Pt states her contractions are getting stronger. SVE performed. 380/-3 with bulging membranes. Pt ambulated to BR at this time to void  with accelerations. 0420 Pt taken off the monitor at this time to ambulate in the room for comfort. Pt denies needing pain medication at this time. Pt admitted for labor at this time and orders received to start GBS prophylaxis. 0500 PCN 5 million units started for GBS prophylaxis at this time. 7511 SBAR report given to Tiffany Klein and transfer of care given.

## 2018-07-28 NOTE — PROGRESS NOTES
I have received SBAR from Dr. Martha Ly, have assumed care of the patient, and have introduced myself to the patient and her family. The patient has returned from ambulating, is having painful contractions, and desires an epidural. 
Visit Vitals  /74 (BP 1 Location: Right arm, BP Patient Position: At rest)  Pulse 66  Temp 99.1 °F (37.3 °C)  Resp 18  Ht 5' 6\" (1.676 m)  Wt 78 kg (172 lb)  SpO2 96%  Breastfeeding Yes  BMI 27.76 kg/m2 FHR: 135 moderate variability, accelerations present, no decels, cat 1 [place]: irregular contractions EFW: 8 pounds Sve: 4/50/-2 vtx (exam performed by nurse) Ass/Plan:  at 40 2/7 wks in early labor, GDMA1, GBS positive, cat 1 fetal tracing Epidural 
Amniotomy after she receives her second dose of PCN Pitocin augmentation prn Fingerstick q 4 hours in early labor and q 2 hours in active labor Sliding scale insulin if needed Patient agreeable with the plan of care

## 2018-07-28 NOTE — PROGRESS NOTES
The patient denies feeling pelvic pressure and is comfortable with her epidural. 
Visit Vitals  /59  Pulse 61  Temp 99.2 °F (37.3 °C)  Resp 18  Ht 5' 6\" (1.676 m)  Wt 78 kg (172 lb)  SpO2 98%  Breastfeeding Yes  BMI 27.76 kg/m2 FSE: 140 moderate variability, accelerations present, occasional early decels and variable decels, cat 2 IUPC: contractions q 1-2 minutes, pitocin at 5 mu Sve: 590/-1 to 0 vtx Ass/Plan:  at 40 2/7 wks in early labor Keep pitocin at 5 mu Continue to closely monitor fetal tracing

## 2018-07-28 NOTE — PROGRESS NOTES
The patient is comfortable and without complaints. Visit Vitals  /62  Pulse 63  Temp 99.2 °F (37.3 °C)  Resp 18  Ht 5' 6\" (1.676 m)  Wt 78 kg (172 lb)  SpO2 97%  Breastfeeding Yes  BMI 27.76 kg/m2 FHR: 135 moderate variability, variable decels, cat 2 [place]: contractions q 1-2 minutes, pitocin at 8 mu Sve: 4/90/-2 to -1 vtx, adequate pelvimetry, FSE and IUPC placed Ass/Plan:  at 40 2/7 wks in early labor, cat 2 fetal tracing Patient repositioned Continue to closely monitor fetal tracing Amnioinfusion if variable decels are persistent and repetitive

## 2018-07-28 NOTE — PROGRESS NOTES
0700 Received bedside report from Stephenie Peña RN. Patient is in bed laboring at this time. RN discuss plan for NST and ambulate the halls to help induce labor. Will continue to monitor. 26 RN received order to place kim catheter. Kim placed by RN using sterile technique. Patient tolerated well. Will continue to monitor. 18 Dr Ede Gallagher at the bedside assessing the patient's labor progress. SVE 4/90/-1, FSE and IUPC placed. 1537 Deceleration noted pit was turned down to 4mL/hr. 
 
 
1722 Ground for FSE was detached while repositioning the patient. RN adjusted. 1914 Bedside and Verbal shift change report given to Stephenie Peña RN (oncoming nurse) by Kelly Mcmillan RN (offgoing nurse). Report included the following information SBAR, Kardex and MAR.

## 2018-07-29 LAB
GLUCOSE BLD STRIP.AUTO-MCNC: 84 MG/DL (ref 65–100)
SERVICE CMNT-IMP: NORMAL

## 2018-07-29 PROCEDURE — 82962 GLUCOSE BLOOD TEST: CPT

## 2018-07-29 PROCEDURE — 74011250636 HC RX REV CODE- 250/636: Performed by: OBSTETRICS & GYNECOLOGY

## 2018-07-29 PROCEDURE — 74011250636 HC RX REV CODE- 250/636

## 2018-07-29 PROCEDURE — 88307 TISSUE EXAM BY PATHOLOGIST: CPT | Performed by: OBSTETRICS & GYNECOLOGY

## 2018-07-29 PROCEDURE — 75410000002 HC LABOR FEE PER 1 HR: Performed by: OBSTETRICS & GYNECOLOGY

## 2018-07-29 PROCEDURE — 65270000029 HC RM PRIVATE

## 2018-07-29 PROCEDURE — C1765 ADHESION BARRIER: HCPCS

## 2018-07-29 PROCEDURE — 74011250636 HC RX REV CODE- 250/636: Performed by: ANESTHESIOLOGY

## 2018-07-29 PROCEDURE — 76010000391 HC C SECN FIRST 1 HR: Performed by: OBSTETRICS & GYNECOLOGY

## 2018-07-29 PROCEDURE — 77030011640 HC PAD GRND REM COVD -A

## 2018-07-29 PROCEDURE — 76010000392 HC C SECN EA ADDL 0.5 HR: Performed by: OBSTETRICS & GYNECOLOGY

## 2018-07-29 PROCEDURE — 76060000078 HC EPIDURAL ANESTHESIA: Performed by: OBSTETRICS & GYNECOLOGY

## 2018-07-29 PROCEDURE — 74011000258 HC RX REV CODE- 258: Performed by: OBSTETRICS & GYNECOLOGY

## 2018-07-29 PROCEDURE — 75410000003 HC RECOV DEL/VAG/CSECN EA 0.5 HR: Performed by: OBSTETRICS & GYNECOLOGY

## 2018-07-29 PROCEDURE — 77030018836 HC SOL IRR NACL ICUM -A

## 2018-07-29 PROCEDURE — 74011250637 HC RX REV CODE- 250/637: Performed by: ANESTHESIOLOGY

## 2018-07-29 PROCEDURE — 77030032490 HC SLV COMPR SCD KNE COVD -B

## 2018-07-29 RX ORDER — SODIUM CHLORIDE 0.9 % (FLUSH) 0.9 %
5-10 SYRINGE (ML) INJECTION EVERY 8 HOURS
Status: DISCONTINUED | OUTPATIENT
Start: 2018-07-29 | End: 2018-07-31

## 2018-07-29 RX ORDER — SIMETHICONE 80 MG
80 TABLET,CHEWABLE ORAL AS NEEDED
Status: DISCONTINUED | OUTPATIENT
Start: 2018-07-29 | End: 2018-08-01 | Stop reason: HOSPADM

## 2018-07-29 RX ORDER — OXYCODONE HYDROCHLORIDE 5 MG/1
5 TABLET ORAL
Status: DISPENSED | OUTPATIENT
Start: 2018-07-29 | End: 2018-07-30

## 2018-07-29 RX ORDER — HYDROMORPHONE HYDROCHLORIDE 2 MG/ML
1 INJECTION, SOLUTION INTRAMUSCULAR; INTRAVENOUS; SUBCUTANEOUS
Status: DISCONTINUED | OUTPATIENT
Start: 2018-07-29 | End: 2018-08-01 | Stop reason: HOSPADM

## 2018-07-29 RX ORDER — NALOXONE HYDROCHLORIDE 0.4 MG/ML
0.4 INJECTION, SOLUTION INTRAMUSCULAR; INTRAVENOUS; SUBCUTANEOUS AS NEEDED
Status: DISCONTINUED | OUTPATIENT
Start: 2018-07-29 | End: 2018-08-01 | Stop reason: HOSPADM

## 2018-07-29 RX ORDER — OXYTOCIN/RINGER'S LACTATE 20/1000 ML
125-500 PLASTIC BAG, INJECTION (ML) INTRAVENOUS ONCE
Status: ACTIVE | OUTPATIENT
Start: 2018-07-29 | End: 2018-07-29

## 2018-07-29 RX ORDER — IBUPROFEN 800 MG/1
800 TABLET ORAL EVERY 8 HOURS
Status: DISCONTINUED | OUTPATIENT
Start: 2018-07-29 | End: 2018-08-01 | Stop reason: HOSPADM

## 2018-07-29 RX ORDER — ONDANSETRON 2 MG/ML
INJECTION INTRAMUSCULAR; INTRAVENOUS AS NEEDED
Status: DISCONTINUED | OUTPATIENT
Start: 2018-07-29 | End: 2018-07-29 | Stop reason: HOSPADM

## 2018-07-29 RX ORDER — CEFAZOLIN SODIUM/WATER 2 G/20 ML
2 SYRINGE (ML) INTRAVENOUS ONCE
Status: COMPLETED | OUTPATIENT
Start: 2018-07-29 | End: 2018-07-29

## 2018-07-29 RX ORDER — NALOXONE HYDROCHLORIDE 0.4 MG/ML
0.2 INJECTION, SOLUTION INTRAMUSCULAR; INTRAVENOUS; SUBCUTANEOUS
Status: ACTIVE | OUTPATIENT
Start: 2018-07-29 | End: 2018-07-30

## 2018-07-29 RX ORDER — MORPHINE SULFATE 0.5 MG/ML
INJECTION, SOLUTION EPIDURAL; INTRATHECAL; INTRAVENOUS AS NEEDED
Status: DISCONTINUED | OUTPATIENT
Start: 2018-07-29 | End: 2018-07-29 | Stop reason: HOSPADM

## 2018-07-29 RX ORDER — OXYCODONE AND ACETAMINOPHEN 5; 325 MG/1; MG/1
1 TABLET ORAL
Status: DISCONTINUED | OUTPATIENT
Start: 2018-07-29 | End: 2018-08-01 | Stop reason: HOSPADM

## 2018-07-29 RX ORDER — DIPHENHYDRAMINE HYDROCHLORIDE 50 MG/ML
25 INJECTION, SOLUTION INTRAMUSCULAR; INTRAVENOUS ONCE
Status: COMPLETED | OUTPATIENT
Start: 2018-07-29 | End: 2018-07-29

## 2018-07-29 RX ORDER — KETOROLAC TROMETHAMINE 30 MG/ML
30 INJECTION, SOLUTION INTRAMUSCULAR; INTRAVENOUS
Status: DISPENSED | OUTPATIENT
Start: 2018-07-29 | End: 2018-07-30

## 2018-07-29 RX ORDER — OXYCODONE HYDROCHLORIDE 5 MG/1
10 TABLET ORAL
Status: ACTIVE | OUTPATIENT
Start: 2018-07-29 | End: 2018-07-30

## 2018-07-29 RX ORDER — SWAB
1 SWAB, NON-MEDICATED MISCELLANEOUS DAILY
Status: DISCONTINUED | OUTPATIENT
Start: 2018-07-29 | End: 2018-08-01 | Stop reason: HOSPADM

## 2018-07-29 RX ORDER — DEXAMETHASONE SODIUM PHOSPHATE 4 MG/ML
INJECTION, SOLUTION INTRA-ARTICULAR; INTRALESIONAL; INTRAMUSCULAR; INTRAVENOUS; SOFT TISSUE AS NEEDED
Status: DISCONTINUED | OUTPATIENT
Start: 2018-07-29 | End: 2018-07-29 | Stop reason: HOSPADM

## 2018-07-29 RX ORDER — OXYTOCIN 10 [USP'U]/ML
INJECTION, SOLUTION INTRAMUSCULAR; INTRAVENOUS AS NEEDED
Status: DISCONTINUED | OUTPATIENT
Start: 2018-07-29 | End: 2018-07-29 | Stop reason: HOSPADM

## 2018-07-29 RX ORDER — SODIUM CHLORIDE, SODIUM LACTATE, POTASSIUM CHLORIDE, CALCIUM CHLORIDE 600; 310; 30; 20 MG/100ML; MG/100ML; MG/100ML; MG/100ML
125 INJECTION, SOLUTION INTRAVENOUS CONTINUOUS
Status: DISCONTINUED | OUTPATIENT
Start: 2018-07-29 | End: 2018-08-01 | Stop reason: HOSPADM

## 2018-07-29 RX ORDER — SODIUM CHLORIDE 0.9 % (FLUSH) 0.9 %
5-10 SYRINGE (ML) INJECTION AS NEEDED
Status: DISCONTINUED | OUTPATIENT
Start: 2018-07-29 | End: 2018-07-31

## 2018-07-29 RX ORDER — LIDOCAINE HYDROCHLORIDE AND EPINEPHRINE 20; 5 MG/ML; UG/ML
INJECTION, SOLUTION EPIDURAL; INFILTRATION; INTRACAUDAL; PERINEURAL AS NEEDED
Status: DISCONTINUED | OUTPATIENT
Start: 2018-07-29 | End: 2018-07-29 | Stop reason: HOSPADM

## 2018-07-29 RX ORDER — EPHEDRINE SULFATE 50 MG/ML
INJECTION, SOLUTION INTRAVENOUS AS NEEDED
Status: DISCONTINUED | OUTPATIENT
Start: 2018-07-29 | End: 2018-07-29 | Stop reason: HOSPADM

## 2018-07-29 RX ADMIN — Medication 11 MILLI-UNITS/MIN: at 02:00

## 2018-07-29 RX ADMIN — OXYTOCIN 20 UNITS: 10 INJECTION, SOLUTION INTRAMUSCULAR; INTRAVENOUS at 04:50

## 2018-07-29 RX ADMIN — CEFAZOLIN SODIUM 1 G: 1 INJECTION, POWDER, FOR SOLUTION INTRAMUSCULAR; INTRAVENOUS at 13:38

## 2018-07-29 RX ADMIN — MORPHINE SULFATE 2 MG: 0.5 INJECTION, SOLUTION EPIDURAL; INTRATHECAL; INTRAVENOUS at 05:01

## 2018-07-29 RX ADMIN — DIPHENHYDRAMINE HYDROCHLORIDE 25 MG: 50 INJECTION, SOLUTION INTRAMUSCULAR; INTRAVENOUS at 00:43

## 2018-07-29 RX ADMIN — LIDOCAINE HYDROCHLORIDE AND EPINEPHRINE 5 ML: 20; 5 INJECTION, SOLUTION EPIDURAL; INFILTRATION; INTRACAUDAL; PERINEURAL at 04:24

## 2018-07-29 RX ADMIN — Medication 2 G: at 04:32

## 2018-07-29 RX ADMIN — KETOROLAC TROMETHAMINE 30 MG: 30 INJECTION, SOLUTION INTRAMUSCULAR at 19:48

## 2018-07-29 RX ADMIN — LIDOCAINE HYDROCHLORIDE AND EPINEPHRINE 5 ML: 20; 5 INJECTION, SOLUTION EPIDURAL; INFILTRATION; INTRACAUDAL; PERINEURAL at 04:20

## 2018-07-29 RX ADMIN — ONDANSETRON 4 MG: 2 INJECTION INTRAMUSCULAR; INTRAVENOUS at 04:58

## 2018-07-29 RX ADMIN — DEXAMETHASONE SODIUM PHOSPHATE 4 MG: 4 INJECTION, SOLUTION INTRA-ARTICULAR; INTRALESIONAL; INTRAMUSCULAR; INTRAVENOUS; SOFT TISSUE at 04:58

## 2018-07-29 RX ADMIN — Medication 9 MILLI-UNITS/MIN: at 01:33

## 2018-07-29 RX ADMIN — CEFAZOLIN SODIUM 1 G: 1 INJECTION, POWDER, FOR SOLUTION INTRAMUSCULAR; INTRAVENOUS at 22:23

## 2018-07-29 RX ADMIN — PENICILLIN G POTASSIUM 2.5 MILLION UNITS: 20000000 POWDER, FOR SOLUTION INTRAVENOUS at 00:43

## 2018-07-29 RX ADMIN — OXYCODONE HYDROCHLORIDE 5 MG: 5 TABLET ORAL at 13:23

## 2018-07-29 RX ADMIN — SODIUM CHLORIDE, SODIUM LACTATE, POTASSIUM CHLORIDE, AND CALCIUM CHLORIDE: 600; 310; 30; 20 INJECTION, SOLUTION INTRAVENOUS at 04:20

## 2018-07-29 RX ADMIN — SODIUM CHLORIDE, SODIUM LACTATE, POTASSIUM CHLORIDE, AND CALCIUM CHLORIDE 125 ML/HR: 600; 310; 30; 20 INJECTION, SOLUTION INTRAVENOUS at 01:46

## 2018-07-29 RX ADMIN — KETOROLAC TROMETHAMINE 30 MG: 30 INJECTION, SOLUTION INTRAMUSCULAR at 08:51

## 2018-07-29 RX ADMIN — LIDOCAINE HYDROCHLORIDE AND EPINEPHRINE 3 ML: 20; 5 INJECTION, SOLUTION EPIDURAL; INFILTRATION; INTRACAUDAL; PERINEURAL at 04:29

## 2018-07-29 RX ADMIN — SODIUM CHLORIDE, SODIUM LACTATE, POTASSIUM CHLORIDE, AND CALCIUM CHLORIDE: 600; 310; 30; 20 INJECTION, SOLUTION INTRAVENOUS at 05:23

## 2018-07-29 RX ADMIN — KETOROLAC TROMETHAMINE 30 MG: 30 INJECTION, SOLUTION INTRAMUSCULAR at 13:23

## 2018-07-29 RX ADMIN — SODIUM CHLORIDE, SODIUM LACTATE, POTASSIUM CHLORIDE, AND CALCIUM CHLORIDE: 600; 310; 30; 20 INJECTION, SOLUTION INTRAVENOUS at 04:50

## 2018-07-29 RX ADMIN — EPHEDRINE SULFATE 5 MG: 50 INJECTION, SOLUTION INTRAVENOUS at 05:10

## 2018-07-29 RX ADMIN — Medication 7 MILLI-UNITS/MIN: at 01:18

## 2018-07-29 RX ADMIN — Medication 12 ML/HR: at 02:28

## 2018-07-29 NOTE — ROUTINE PROCESS
Bedside and Verbal shift change report given to Anne Marie Lynn RN (oncoming nurse) by Mark Leone RN (offgoing nurse). Report given with SBAR, Kardex, Intake/Output and MAR.

## 2018-07-29 NOTE — ANESTHESIA POSTPROCEDURE EVALUATION
Post-Anesthesia Evaluation and Assessment Patient: Irina Jaime MRN: [id number]  SSN: [id number] YOB: 1980  Age: 41 y.o. Sex: female Cardiovascular Function/Vital Signs Visit Vitals  /67  Pulse (!) 57  Temp 36.8 °C (98.3 °F)  Resp 16  
 Ht 5' 6\" (1.676 m)  Wt 78 kg (172 lb)  SpO2 94%  Breastfeeding Yes  BMI 27.76 kg/m2 Patient is status post CSE anesthesia for Procedure(s):  SECTION. Nausea/Vomiting: None Postoperative hydration reviewed and adequate. Pain: 
Pain Scale 1: Numeric (0 - 10) (18 0724) Pain Intensity 1: 0 (18 0651) Managed Neurological Status:  
Neuro (WDL): Exceptions to WDL (18 0550) Neuro Neurologic State: Pharmacologically paralyzed; Alert (18 0550) Orientation Level: Appropriate for age (18 0550) At baseline Mental Status and Level of Consciousness: Arousable Pulmonary Status:  
O2 Device: Room air (18 0636) Adequate oxygenation and airway patent Complications related to anesthesia: None Post-anesthesia assessment completed. No concerns Signed By: Walter Alfredo MD   
 2018

## 2018-07-29 NOTE — PROGRESS NOTES
4287 - Assessment AM performed. FOB at bedside. 4437 - Toradol per pt request for pain. 0930 - Epidural out, tip intact. Baby put skin to skin, pt assisted to attempt to breastfeed. 1030- Esparza emptied 500c, still blood tinged. Underwear on.  
2981 - Report called to [facility].

## 2018-07-29 NOTE — BRIEF OP NOTE
BRIEF OPERATIVE NOTE Date of Procedure: 2018 Preoperative Diagnosis: 42 yo  at 40 3/7 wks with active phase arrest, chorioamnionitis, meconium Postoperative Diagnosis: same, term live male infant Procedure(s):  SECTION, LSTCS Surgeon(s) and Role: * Lauro Reynoso MD - Primary Surgical Assistant: OR Staff Surgical Staff: 
Circ-1: Leilani Gil RN 
Circ-2: Po Enriquez RN Scrub Tech-1: Jann Mittal [place] Surg Asst-2: Lambert Fret Event Time In Incision Start 8389 Incision Close 0533 Anesthesia: Epidural  
Estimated Blood Loss: 700 ml IV Fluid: 1600 ml Urine output: 75 ml Specimens:  
ID Type Source Tests Collected by Time Destination 1 : Placenta Placenta Uterus  Lauro Reynoso MD 2018 8418 Pathology Findings: Term live male infant in OA presentation with apgars 6, 9 weighing 7 pounds 13 ounces. Thick meconium was noted and there was a loose nuchal cord x 1. The placenta was noted to have meconium staining of the membranes and was otherwise of normal appearance. A 4 x 4 cm intramural uterine fibroid was noted in the left lower uterine body. Endometriosis and adhesions were noted along the lower posterior aspect of the uterus and bilateral ovaries resulting in the ovaries being densely adhered to the lower posterior aspect of the uterus. Adhesions were also noted between the posterior cul de sac and lower posterior aspect of the uterus. Arterial cord blood gas collected with pH of 7.25 Complications: none Implants:  
Implant Name Type Inv. Item Serial No.  Lot No. LRB No. Used Action  
seprafilm       GENZYME [id number] Bilateral 1 Implanted Job ID: [id number]

## 2018-07-29 NOTE — PROGRESS NOTES
The patient is without complaints and is resting comfortably. Visit Vitals  /56  Pulse (!) 57  Temp 98.9 °F (37.2 °C)  Resp 18  Ht 5' 6\" (1.676 m)  Wt 78 kg (172 lb)  SpO2 97%  Breastfeeding Yes  BMI 27.76 kg/m2 FSE: 140 moderate variability, episodic variable decels, cat 2 IUPC: contractions q 1-3 minutes, pitocin at 11 mu Sve: 9/90/0 vtx, thick anterior lip Ass/Plan:  at 40 3/7 wks with active phase arrest, meconium, chorioamnionitis Proceed with delivery by  Risks of  section discussed included bleeding with the possible need for blood products; injury to the broad ligament, bladder, ureters, and/or bowel; endometritis; wound infection; and thromboembolism. The patient voiced understanding and is accepting of these risks.

## 2018-07-29 NOTE — PROGRESS NOTES
Pt still has kim in due to urine being bloody and orange, and 40 mL per hour. Will continue to monitor at this time.

## 2018-07-29 NOTE — PROGRESS NOTES
The patient is without complaints. Visit Vitals  /64 (BP 1 Location: Right arm, BP Patient Position: At rest)  Pulse 61  Temp 99.3 °F (37.4 °C)  Resp 18  Ht 5' 6\" (1.676 m)  Wt 78 kg (172 lb)  SpO2 99%  Breastfeeding Yes  BMI 27.76 kg/m2 Current temp: 100.9 FSE: 165 moderate variability, accelerations, episodic variable decels IUPC: contractions q 2 minutes, pitocin at 5 mu Sve: 7-8/90/0 vtx (exam by nurse) Ass/Plan:  at 40 2/7 wks in active labor, possible chorioamnionitis Add gentamicin to pcn regimen Tylenol 1 gm po now Keep pitocin at 5 mu

## 2018-07-29 NOTE — OP NOTES
[address] REPORT    Name:Romy JONES  MR#: [id number]  : 1980  ACCOUNT #: [de-identified]   DATE OF SERVICE: 2018    PREOPERATIVE DIAGNOSES:  A 81-year-old  3, para 0-0-2-0 at 40-3/7 weeks with active phase arrest, chorioamnionitis and meconium. POSTOPERATIVE DIAGNOSES:  A 81-year-old  3, para 0-0-2-0 at 40-3/7 weeks with active phase arrest, chorioamnionitis and meconium, term live male infant. PROCEDURE PERFORMED:  Primary lower segment transverse  section. SURGEON:  Amadeo Craft MD    ASSISTANT:  Minor Avelar     ANESTHESIA:  Epidural.    ANESTHESIOLOGIST:  Dr. Justen Bartholomew. ESTIMATED BLOOD LOSS:  750 mL    SPECIMENS REMOVED:  Placenta sent to pathology    COMPLICATIONS:  none    IV FLUIDS: 1600 mL    URINE OUTPUT:  75 mL    FINDINGS:  A term live male infant in occiput anterior presentation with Apgars 6 and 9, weighing 7 pounds 13 ounces. Thick meconium was noted and there was also a loose nuchal cord that was easily reduced. The placenta was noted to have meconium staining of the membranes and was otherwise normal appearance. A 4 x 4 cm intramural uterine fibroid was noted in the left lower uterine body. Endometriosis and extensive adhesions were noted along the lower posterior aspect of the uterus resulting in bilateral ovaries being densely adhered to the lower posterior aspect of the uterus. Adhesions were also noted between the posterior cul-de-sac and the lower posterior aspect of the uterus. An arterial cord blood gas was collected and the pH came back at 7.25. PROCEDURE:  The patient was taken to the operating room where she was prepped and draped in a sterile fashion. A surgical timeout was performed and the patient was noted to have adequate anesthesia. A Pfannenstiel incision was made with a scalpel and carried down to the underlying layer of fascia using a second scalpel.   The fascia was scored in the midline with the Bovie and the fascial incision was extended laterally with the Bovie. The anterior aspect of the fascial incision was grasped with Kocher clamps and the underlying rectus bellies were dissected down using the Bovie. The Kocher clamps were placed on the inferior aspect of the fascial incision and the rectus bellies were dissected down using Rutledge scissors. The peritoneum was carefully entered with blunt dissection and the peritoneal opening was extended superiorly and inferiorly with the Bovie. The peritoneal opening was manually stretched. A bladder blade was inserted. The vesicouterine peritoneum was tented with pickups and entered sharply with Metzenbaum scissors. The vesicouterine peritoneal opening was extended laterally with the Metzenbaums and the bladder flap was taken down digitally. The bladder blade was removed and reinserted. The scalpel was used to make a small horizontal incision in the midportion of the lower uterine segment until the uterine cavity was entered. The hysterotomy was extended superiorly and inferiorly with blunt digital dissection. The infant's head was elevated into the uterine incision and fundal pressure was applied. The infant's head was delivered atraumatically. The infant's mouth and nose was thoroughly bulb suctioned. There was a loose nuchal cord x1 that was easily reduced. Fundal pressure was again applied and the infant's torso was delivered atraumatically. The umbilical cord was clamped and cut and the infant was handed off to the waiting nurse. A segment of the umbilical cord was clamped and cut for a collection of cord blood gases. The placenta was delivered by gentle traction on the umbilical cord and uterine massage. The uterus was exteriorized and the uterine cavity was cleared of blood and remaining debris. The uterine incision was reapproximated with a running locked suture of 0 Monocryl.   A second suture of 0 Monocryl was used to perform an imbricating stitch. Good hemostasis was seen along the uterine incision. The posterior cul-de-sac was suctioned and the uterus was placed back into the patient's pelvis. The paracolic gutters were cleared of blood and clots and the patient's pelvis was thoroughly irrigated. Good hemostasis was seen throughout. Seprafilm was placed over the uterine incision and over the anterior uterine body. The peritoneum was reapproximated with a running locked suture of 2-0 Vicryl. The rectus bellies were reapproximated with mattress sutures of 2-0 Vicryl. The fascia was reapproximated with a running suture of 0 Vicryl. The subcutaneous tissue was irrigated and areas of bleeding were coagulated with the Bovie. The subcutaneous tissue was then reapproximated with a running suture of 2-0 plain and the skin was reapproximated with a subcuticular stitch of 4-0 Monocryl. Laparotomy pad, needle and instrument counts were correct x2. The patient received 2 grams of Ancef preoperatively and was taken to the labor and delivery room in stable condition.       Ross Cox MD       [facility] / [facility]  D: 07/29/2018 06:26     T: 07/29/2018 11:21  JOB #: [id number]  CC: Eugenia Weber MD

## 2018-07-30 LAB
BASOPHILS # BLD: 0 K/UL (ref 0–0.1)
BASOPHILS NFR BLD: 0 % (ref 0–1)
DIFFERENTIAL METHOD BLD: ABNORMAL
EOSINOPHIL # BLD: 0.1 K/UL (ref 0–0.4)
EOSINOPHIL NFR BLD: 1 % (ref 0–7)
ERYTHROCYTE [DISTWIDTH] IN BLOOD BY AUTOMATED COUNT: 13.4 % (ref 11.5–14.5)
HCT VFR BLD AUTO: 29.7 % (ref 35–47)
HGB BLD-MCNC: 9.9 G/DL (ref 11.5–16)
IMM GRANULOCYTES # BLD: 0.1 K/UL (ref 0–0.04)
IMM GRANULOCYTES NFR BLD AUTO: 1 % (ref 0–0.5)
LYMPHOCYTES # BLD: 1.7 K/UL (ref 0.8–3.5)
LYMPHOCYTES NFR BLD: 15 % (ref 12–49)
MCH RBC QN AUTO: 30 PG (ref 26–34)
MCHC RBC AUTO-ENTMCNC: 33.3 G/DL (ref 30–36.5)
MCV RBC AUTO: 90 FL (ref 80–99)
MONOCYTES # BLD: 0.9 K/UL (ref 0–1)
MONOCYTES NFR BLD: 8 % (ref 5–13)
NEUTS SEG # BLD: 8.1 K/UL (ref 1.8–8)
NEUTS SEG NFR BLD: 75 % (ref 32–75)
NRBC # BLD: 0 K/UL (ref 0–0.01)
NRBC BLD-RTO: 0 PER 100 WBC
PLATELET # BLD AUTO: 139 K/UL (ref 150–400)
PMV BLD AUTO: 11.2 FL (ref 8.9–12.9)
RBC # BLD AUTO: 3.3 M/UL (ref 3.8–5.2)
WBC # BLD AUTO: 10.9 K/UL (ref 3.6–11)

## 2018-07-30 PROCEDURE — 77030027138 HC INCENT SPIROMETER -A

## 2018-07-30 PROCEDURE — 85025 COMPLETE CBC W/AUTO DIFF WBC: CPT | Performed by: OBSTETRICS & GYNECOLOGY

## 2018-07-30 PROCEDURE — 65270000029 HC RM PRIVATE

## 2018-07-30 PROCEDURE — 74011250636 HC RX REV CODE- 250/636: Performed by: OBSTETRICS & GYNECOLOGY

## 2018-07-30 PROCEDURE — 74011250637 HC RX REV CODE- 250/637: Performed by: OBSTETRICS & GYNECOLOGY

## 2018-07-30 PROCEDURE — 74011000258 HC RX REV CODE- 258: Performed by: OBSTETRICS & GYNECOLOGY

## 2018-07-30 PROCEDURE — 36415 COLL VENOUS BLD VENIPUNCTURE: CPT | Performed by: OBSTETRICS & GYNECOLOGY

## 2018-07-30 RX ADMIN — CEFAZOLIN SODIUM 1 G: 1 INJECTION, POWDER, FOR SOLUTION INTRAMUSCULAR; INTRAVENOUS at 05:43

## 2018-07-30 RX ADMIN — IBUPROFEN 800 MG: 800 TABLET ORAL at 02:12

## 2018-07-30 RX ADMIN — IBUPROFEN 800 MG: 800 TABLET ORAL at 09:55

## 2018-07-30 RX ADMIN — IBUPROFEN 800 MG: 800 TABLET ORAL at 18:04

## 2018-07-30 RX ADMIN — Medication 1 TABLET: at 09:56

## 2018-07-30 NOTE — PROGRESS NOTES
Nutrition Brief Note: 
 
Received [facility] referral for gestational diabetes. Pt now post-partum. Noted no previous history for DM, BG well controlled, 84-97-70. Pt on regular lactation diet, with good appetite per nursing. Nutrition interventions, not indicated at this time. We will continue to follow per protocol. Thank you. Paul Silva RD Pager [phone] Office [phone]

## 2018-07-30 NOTE — PROGRESS NOTES
Post-Operative  Progress Note Late entry : rounded at 130pm 
 
Information for the patient's newbornFelicemily Raymundo, Male [[id number]] , Low Transverse Patient doing well without significant complaint. Nausea and vomiting resolved. She is tolerating oral intake. Pain well controlled. Delivery information: 
Information for the patient's :  Shruthi Marroquin, Male [[id number]] Delivery of a 7 lb 13.2 oz (3.55 kg) male infant via , Low Transverse on 2018 at 4:48 AM  by . Apgars were 6 and 9. Vitals: 
Visit Vitals  /60 (BP 1 Location: Right arm, BP Patient Position: At rest)  Pulse 65  Temp 98.3 °F (36.8 °C)  Resp 16  
 Ht 5' 6\" (1.676 m)  Wt 172 lb (78 kg)  LMP 10/25/2017 (Exact Date)  SpO2 96%  Breastfeeding Yes  BMI 27.76 kg/m2 Temp (24hrs), Av.1 °F (36.7 °C), Min:98 °F (36.7 °C), Max:98.3 °F (36.8 °C) Last 24hr Input/Output: 
 
Intake/Output Summary (Last 24 hours) at 18 1711 Last data filed at 18 1000 Gross per 24 hour Intake                0 ml Output             2425 ml Net            -2425 ml Exam:   
Gen: Patient without distress Lungs: clear to ascultation bilaterally Cor: S1, S2, regular rate and rhythm Abdomen: bowel sounds present, soft, appropriate tenderness, fundus firm Incision: clean, dry and intact dressing Lower extremities: negative for cords or tenderness, trace edema bilaterally Labs:  
Lab Results Component Value Date/Time  WBC 10.9 2018 05:18 AM  
 WBC 9.5 2018 05:03 AM  
 WBC 9.8 2018 04:05 PM  
 WBC 8.6 2017 11:45 AM  
 WBC 9.6 2017 02:43 PM  
 WBC 8.6 2015 10:30 AM  
 HGB 9.9 (L) 2018 05:18 AM  
 HGB 12.9 2018 05:03 AM  
 HGB 11.8 2018 04:05 PM  
 HGB 13.4 2017 11:45 AM  
 HGB 13.6 2017 02:43 PM  
 HGB 13.5 2015 10:30 AM  
 HCT 29.7 (L) 2018 05:18 AM  
 HCT 39.7 2018 05:03 AM  
 HCT 35.4 2018 04:05 PM  
 HCT 42.2 2017 11:45 AM  
 HCT 41.8 2017 02:43 PM  
 HCT 39.6 2015 10:30 AM  
 PLATELET 407 (L)  05:18 AM  
 PLATELET 610  05:03 AM  
 PLATELET 027  04:05 PM  
 PLATELET 012 15/12/9189 11:45 AM  
 PLATELET 701  02:43 PM  
 PLATELET 646  10:30 AM  
 Hgb, External 11.8-on 2018 Hgb, External 11.8 2018 Hgb, External 13.4 2017 Hct, External 35.4- on 2018 Hct, External 35.4 2018 Hct, External 42.4 2017 Platelet cnt., External 190- on 2018 Platelet cnt., External 190 2018 Platelet cnt., External 265 2017 Recent Results (from the past 24 hour(s)) CBC WITH AUTOMATED DIFF Collection Time: 18  5:18 AM  
Result Value Ref Range WBC 10.9 3.6 - 11.0 K/uL  
 RBC 3.30 (L) 3.80 - 5.20 M/uL HGB 9.9 (L) 11.5 - 16.0 g/dL HCT 29.7 (L) 35.0 - 47.0 % MCV 90.0 80.0 - 99.0 FL  
 MCH 30.0 26.0 - 34.0 PG  
 MCHC 33.3 30.0 - 36.5 g/dL  
 RDW 13.4 11.5 - 14.5 % PLATELET 137 (L) 246 - 400 K/uL MPV 11.2 8.9 - 12.9 FL  
 NRBC 0.0 0  WBC ABSOLUTE NRBC 0.00 0.00 - 0.01 K/uL NEUTROPHILS 75 32 - 75 % LYMPHOCYTES 15 12 - 49 % MONOCYTES 8 5 - 13 % EOSINOPHILS 1 0 - 7 % BASOPHILS 0 0 - 1 % IMMATURE GRANULOCYTES 1 (H) 0.0 - 0.5 % ABS. NEUTROPHILS 8.1 (H) 1.8 - 8.0 K/UL  
 ABS. LYMPHOCYTES 1.7 0.8 - 3.5 K/UL  
 ABS. MONOCYTES 0.9 0.0 - 1.0 K/UL  
 ABS. EOSINOPHILS 0.1 0.0 - 0.4 K/UL  
 ABS. BASOPHILS 0.0 0.0 - 0.1 K/UL  
 ABS. IMM. GRANS. 0.1 (H) 0.00 - 0.04 K/UL  
 DF AUTOMATED Assessment:  
Post-Op , stable Plan: 1. Routine post-operative care 2. Encouraged out of bed and ambulation 3. Oral pain medications 4. Advance diet 5. Continue postpartum and  teaching by nursing

## 2018-07-30 NOTE — LACTATION NOTE
This note was copied from a baby's chart. Discussed with mother her plan for feeding. Reviewed the benefits of exclusive breast milk feeding during the hospital stay. Informed her of the risks of using formula to supplement in the first few days of life as well as the benefits of successful breast milk feeding; referred her to the Breastfeeding booklet about this information. She acknowledges understanding of information reviewed and states that it is her plan to both breast and formula her infant. Will support her choice and offer additional information as needed. Reviewed breastfeeding basics:  How milk is made and normal  breastfeeding behaviors discussed. Supply and demand,  stomach size, early feeding cues, skin to skin bonding with comfortable positioning and baby led latch-on reviewed. How to identify signs of successful breastfeeding sessions reviewed; education on assymetrical latch, signs of effective latching vs shallow, in-effective latching, normal  feeding frequency and duration and expected infant output discussed. Normal course of breastfeeding discussed including the AAP's recommendation that children receive exclusive breast milk feedings for the first six months of life with breast milk feedings to continue through the first year of life and/or beyond as complimentary table foods are added. Breastfeeding Booklet and Warm line information provided with discussion. Discussed typical  weight loss and the importance of pediatrician appointment within 24-48 hours of discharge, at 2 weeks of life and normalcy of requesting pediatric weight checks as needed in between visits. Pt chooses to do both breast and bottle.   Discussed effects of early supplementation on breastfeeding success; may decrease breastmilk production and supply, increase risk for pathological engorgement, baby may develop preference for faster flow from bottles vs breast, and baby's stomach can be stretched if larger volumes of formula are given. Pt will successfully establish breastfeeding by feeding in response to early feeding cues  
or wake every 3h, will obtain deep latch, and will keep log of feedings/output. Taught to BF at hunger cues and or q 2-3 hrs and to offer 10-20 drops of hand expressed colostrum at any non-feeds. Breast Assessment Left Breast: Medium Left Nipple: Everted, Intact Right Breast: Medium Right Nipple: Everted, Intact Breast- Feeding Assessment Attends Breast-Feeding Classes: No 
Breast-Feeding Experience: No 
Breast Trauma/Surgery: No 
Type/Quality: Poor (mostly formula feeding) Lactation Consultant Visits Breast-Feedings: Good Mother/Infant Observation Mother Observation: Alignment, Breast comfortable, Close hold Infant Observation: Latches nipple and aereolae, Lips flanged, lower, Lips flanged, upper, Opens mouth LATCH Documentation Latch: Grasps breast, tongue down, lips flanged, rhythmic sucking Audible Swallowing: A few with stimulation Type of Nipple: Everted (after stimulation) Comfort (Breast/Nipple): Soft/non-tender Hold (Positioning): Full assist, teach one side, mother does other, staff holds LATCH Score: 8 Discussed supply and demand with parents and the importance of putting baby to the breast each feeding even if she is also going to formula feed she should do breast first or pump in order to get milk in. Mother agrees with plan, Guidelines for pumping, milk collection and storage, proper cleaning of pump parts all reviewed. Differences between hospital grade rental pumps vs store bought double electric/hand pumps discussed. Set up pumping with double electric set up. Assisted with pump session. List of area pump rental locations and lactation support services reviewed.

## 2018-07-31 LAB
GLUCOSE BLD STRIP.AUTO-MCNC: 84 MG/DL (ref 65–100)
SERVICE CMNT-IMP: NORMAL

## 2018-07-31 PROCEDURE — 65270000029 HC RM PRIVATE

## 2018-07-31 PROCEDURE — 82962 GLUCOSE BLOOD TEST: CPT

## 2018-07-31 PROCEDURE — 74011250637 HC RX REV CODE- 250/637: Performed by: OBSTETRICS & GYNECOLOGY

## 2018-07-31 RX ORDER — HYDROCODONE BITARTRATE AND ACETAMINOPHEN 5; 325 MG/1; MG/1
1 TABLET ORAL
Qty: 30 TAB | Refills: 0 | Status: SHIPPED | OUTPATIENT
Start: 2018-07-31

## 2018-07-31 RX ORDER — IBUPROFEN 600 MG/1
600 TABLET ORAL
Qty: 30 TAB | Refills: 0 | Status: SHIPPED | OUTPATIENT
Start: 2018-07-31

## 2018-07-31 RX ADMIN — IBUPROFEN 800 MG: 800 TABLET ORAL at 23:24

## 2018-07-31 RX ADMIN — IBUPROFEN 800 MG: 800 TABLET ORAL at 15:12

## 2018-07-31 RX ADMIN — Medication 1 TABLET: at 08:09

## 2018-07-31 RX ADMIN — IBUPROFEN 800 MG: 800 TABLET ORAL at 01:57

## 2018-07-31 RX ADMIN — IBUPROFEN 800 MG: 800 TABLET ORAL at 08:09

## 2018-07-31 RX ADMIN — OXYCODONE HYDROCHLORIDE AND ACETAMINOPHEN 1 TABLET: 5; 325 TABLET ORAL at 15:21

## 2018-07-31 NOTE — DISCHARGE SUMMARY
Obstetrical Discharge Summary     Name: Jaydon Soni MRN: [id number]  SSN: [id number]    YOB: 1980  Age: 41 y.o. Sex: female      Admit Date: 2018    Discharge Date: 18    Admitting Physician: Halima Joseph MD     Attending Physician:  Cari Croft MD     Admission Diagnoses: Normal labor;Normal labor and delivery; Normal labor and del*    Condition on Discharge: Stable    Procedures: LTCS    Disposition: to home    Discharge Diagnoses:   Information for the patient's :  Raysa Aceves, Male [[id number]]   Delivery of a 7 lb 13.2 oz (3.55 kg) male infant via , Low Transverse on 2018 at 4:48 AM  by . Apgars were 6 and 9. Additional Diagnoses:   Hospital Problems  Date Reviewed: 2018          Codes Class Noted POA    Normal labor and delivery ICD-10-CM: O80  ICD-9-CM: 750  2018 Unknown        Normal labor ICD-10-CM: O80, Z37.9  ICD-9-CM: 594  2018 Unknown             Lab Results   Component Value Date/Time    Rubella, External immune 2017    GrBStrep, External positive 2018       Hospital Course: Normal hospital course following the delivery. Her diet was advanced postoperative and she was tolerating general diet on the day of discharge. Her kim was discontinued and she was able to void spontaneously. Her pain was controlled with oral pain medications once she was able to tolerate oral intake. WBC   Date Value Ref Range Status   2018 10.9 3.6 - 11.0 K/uL Final     Comment:     Due to mathematical rounding between the [address], and the new [employer] analyzers, the reported automated differential may vary by up to +/- 0.5% per cell line. This finding may produce a result that is 100% +/- 3%, which is clinically insignificant.        RBC   Date Value Ref Range Status   2018 3.30 (L) 3.80 - 5.20 M/uL Final     HGB   Date Value Ref Range Status   2018 9.9 (L) 11.5 - 16.0 g/dL Final Comment:     INVESTIGATED PER DELTA CHECK PROTOCOL     HCT   Date Value Ref Range Status   07/30/2018 29.7 (L) 35.0 - 47.0 % Final     PLATELET   Date Value Ref Range Status   07/30/2018 139 (L) 150 - 400 K/uL Final     Hgb, External   Date Value Ref Range Status   05/02/2018 11.8-on 4/30/18  Final     Hct, External   Date Value Ref Range Status   05/02/2018 35.4- on 4/30/18  Final     Platelet cnt., External   Date Value Ref Range Status   05/02/2018 190- on 4/30/18  Final         Patient Instructions:   Current Discharge Medication List      CONTINUE these medications which have NOT CHANGED    Details   PRENATAL VIT CALC,IRON,FOLIC (PRENATAL VITAMIN PO) Take  by mouth. Reference my discharge instructions. No orders of the defined types were placed in this encounter.        Signed By:  Asa Hutchinson MD     July 31, 2018

## 2018-07-31 NOTE — LACTATION NOTE
Nancy Shultz Lactation Consultant Signed  Progress Notes Date of Service: 07/31/18 1032 Problem: Lactation Care Plan Goal: *Infant latching appropriately Outcome: Progressing Towards Goal 
Pt will successfully establish breastfeeding by feeding in response to infant's early feeding cues and/or to offer breast every 2-3 hours. Ways to obtain a deep latch and seek comfortable positioning shared, aware to keep log of feedings/output. Goal: *Weight loss less than 10% of birth weight Outcome: Progressing Towards Goal 
Pt chooses to do both breast and bottle. Discussed effects of early supplementation on breastfeeding success; may decrease breastmilk production and supply, increase risk for pathological engorgement, baby may develop preference for faster flow from bottles vs breast, and baby's stomach can be stretched if larger volumes of formula are given. 
  
Problem: Patient Education: Go to Patient Education Activity Goal: Patient/Family Education Outcome: Progressing Towards Goal 
Instructed mother to pump to help stimulate her milk supply if she does not breastfeed and formula feeds at the 3 hr . Celso Chau.  
  
Pumping:  Guidelines for pumping, milk collection and storage, proper cleaning of pump parts all reviewed. How to establish and maintain breast milk supply through pumping reviewed. Differences between hospital grade rental pumps vs store bought double electric/hand pumps discussed. Set up pumping with double electric set up. Assisted with pump session. List of area pump rental locations and lactation support services provided. 
  
Comments: Pt will successfully establish breastfeeding by feeding in response to early feeding cues  
or wake every 3h, will obtain deep latch, and will keep log of feedings/output. Taught to BF at hunger cues and or q 2-3 hrs and to offer 10-20 drops of hand expressed colostrum at any non-feeds.   
  
Breast Assessment Left Breast: Medium Left Nipple: Everted, Intact Right Breast: Medium Right Nipple: Everted, Intact Breast- Feeding Assessment Attends Breast-Feeding Classes: No 
Breast-Feeding Experience: No 
Breast Trauma/Surgery: No 
Type/Quality: Fair (Primarily formula feeding. Mother states baby was spitty and took bottle better. ) Lactation Consultant Visits Breast-Feedings: Good  (Baby was rooting and put to breast - baby opened mouth wide and latched on well. Instructed mother to offer baby breast 1st so that baby gets mom's antibodies. ) Mother/Infant Observation Mother Observation: Alignment, Breast comfortable, Close hold, Recognizes feeding cues, Holds breast 
Infant Observation: Audible swallows, Breast tissue moves, Feeding cues, Latches nipple and aereolae, Lips flanged, upper, Lips flanged, lower, Opens mouth, Rhythmic suck LATCH Documentation Latch: Grasps breast, tongue down, lips flanged, rhythmic sucking Audible Swallowing: A few with stimulation Type of Nipple: Everted (after stimulation) Comfort (Breast/Nipple): Soft/non-tender Hold (Positioning): Full assist, teach one side, mother does other, staff holds LATCH Score: 8

## 2018-07-31 NOTE — PROGRESS NOTES
Post-Operative  Progress Note Information for the patient's :  Krishna Pennington, Male [[id number]] , Low Transverse Patient doing well without significant complaint. Nausea and vomiting resolved. She is tolerating oral intake. Pain well controlled. Delivery information: 
Information for the patient's :  Krishna Pennington Male [[id number]] Delivery of a 7 lb 13.2 oz (3.55 kg) male infant via , Low Transverse on 2018 at 4:48 AM  by . Apgars were 6 and 9. Vitals: 
Visit Vitals  /55 (BP 1 Location: Right arm, BP Patient Position: At rest)  Pulse 60  Temp 98.1 °F (36.7 °C)  Resp 16  
 Ht 5' 6\" (1.676 m)  Wt 172 lb (78 kg)  LMP 10/25/2017 (Exact Date)  SpO2 96%  Breastfeeding Yes  BMI 27.76 kg/m2 Temp (24hrs), Av.3 °F (36.8 °C), Min:98.1 °F (36.7 °C), Max:98.4 °F (36.9 °C) Last 24hr Input/Output: 
No intake or output data in the 24 hours ending 18 1055 Exam:   
Gen: Patient without distress Lungs: clear to ascultation bilaterally Cor: S1, S2, regular rate and rhythm Abdomen: bowel sounds present, soft, appropriate tenderness, fundus firm Incision: clean, dry and intact Lower extremities: negative for cords or tenderness, trace edema bilaterally Labs:  
Lab Results Component Value Date/Time  WBC 10.9 2018 05:18 AM  
 WBC 9.5 2018 05:03 AM  
 WBC 9.8 2018 04:05 PM  
 WBC 8.6 2017 11:45 AM  
 WBC 9.6 2017 02:43 PM  
 WBC 8.6 2015 10:30 AM  
 HGB 9.9 (L) 2018 05:18 AM  
 HGB 12.9 2018 05:03 AM  
 HGB 11.8 2018 04:05 PM  
 HGB 13.4 2017 11:45 AM  
 HGB 13.6 2017 02:43 PM  
 HGB 13.5 2015 10:30 AM  
 HCT 29.7 (L) 2018 05:18 AM  
 HCT 39.7 2018 05:03 AM  
 HCT 35.4 2018 04:05 PM  
 HCT 42.2 2017 11:45 AM  
 HCT 41.8 2017 02:43 PM  
 HCT 39.6 2015 10:30 AM  
 PLATELET 861 (L)  05:18 AM  
 PLATELET 260 2018 05:03 AM  
 PLATELET 279  04:05 PM  
 PLATELET 892  11:45 AM  
 PLATELET 061  02:43 PM  
 PLATELET 914  10:30 AM  
 Hgb, External 11.8-on 2018 Hgb, External 11.8 2018 Hgb, External 13.4 2017 Hct, External 35.4- on 2018 Hct, External 35.4 2018 Hct, External 42.4 2017 Platelet cnt., External 190- on 2018 Platelet cnt., External 190 2018 Platelet cnt., External 265 2017 Recent Results (from the past 24 hour(s)) GLUCOSE, POC Collection Time: 18  7:11 AM  
Result Value Ref Range Glucose (POC) 84 65 - 100 mg/dL Performed by Varun Garza (PCT) Assessment:  
Post-Op , stable POP anemia, acute intraoperative blood loss, stable Plan: 1. Routine post-operative care 2. Encouraged out of bed and ambulation 3. Oral pain medications 4. Advance diet 5. Continue postpartum and  teaching by nursing Lenard Nagy MD

## 2018-07-31 NOTE — PROGRESS NOTES
Bedside shift change report given to Aidee Hernandez (oncoming nurse) by Darin Olivera RN (offgoing nurse). Report included the following information SBAR, Kardex, Intake/Output, MAR and Recent Results.

## 2018-07-31 NOTE — PROGRESS NOTES
Bedside and Verbal shift change report given to Akhil Martinez RN  (oncoming nurse) by Alexandre Beaver RN  (offgoing nurse). Report included the following information SBAR, Kardex, Intake/Output, MAR and Recent Results.

## 2018-08-01 VITALS
RESPIRATION RATE: 16 BRPM | HEART RATE: 51 BPM | WEIGHT: 172 LBS | OXYGEN SATURATION: 96 % | TEMPERATURE: 98.5 F | SYSTOLIC BLOOD PRESSURE: 139 MMHG | BODY MASS INDEX: 27.64 KG/M2 | DIASTOLIC BLOOD PRESSURE: 74 MMHG | HEIGHT: 66 IN

## 2018-08-01 PROCEDURE — 74011250637 HC RX REV CODE- 250/637: Performed by: OBSTETRICS & GYNECOLOGY

## 2018-08-01 RX ADMIN — Medication 1 TABLET: at 07:52

## 2018-08-01 RX ADMIN — IBUPROFEN 800 MG: 800 TABLET ORAL at 07:52

## 2018-08-01 NOTE — PROGRESS NOTES
1341: Patient discharged to home. Discharge instructions and education completed and patient reported she had no more questions. Bands verified on patient and infant, see footprint sheet. Infant placed in car seat by parent. Prescriptions:  
Ibuprofen Gutierrez Lizy

## 2018-08-01 NOTE — LACTATION NOTE
This note was copied from a baby's chart. Mother and baby for discharge. Mother has been formula feeding and pumping. Mother formula fed baby prior to [facility] visit. Mother plans to wean down formula as her milk comes in. She is getting a few ml per pump session. LC discussed the following: 
 
Engorgement Care Guidelines:  Reviewed how milk is made and normal phases of milk production. Taught care of engorged breasts - frequent breastfeeding encouraged, cool packs and motrin as tolerated. Anticipatory guidance shared. Pump every 2-3 hr. For 20 minutes. Reviewed storage and preparation of expressed breast milk. Baby has a pediatric appointment tomorrow for weight check. Pt will successfully establish breastfeeding by feeding in response to early feeding cues  
or wake every 3h, will obtain deep latch, and will keep log of feedings/output. Taught to BF at hunger cues and or q 2-3 hrs and to offer 10-20 drops of hand expressed colostrum at any non-feeds. Breast Assessment Left Breast: Medium (Breasts are slightly full) Left Nipple: Everted, Intact Right Breast: Medium Right Nipple: Everted, Intact Breast- Feeding Assessment Attends Breast-Feeding Classes: No 
Breast-Feeding Experience: No 
Breast Trauma/Surgery: No 
Type/Quality: Attempted (Formula feeding and pumping. Mother formula fed baby 1 hr prior to visit. ) Lactation Consultant Visits Breast-Feedings:  (Mother is formula feeding and pumping. States baby likes bottle better. She plans to pump once home-has a hand pump and plans to get an electric pump. Mother plans to wean down formula as her breastmilk increases. )

## 2018-08-01 NOTE — ROUTINE PROCESS
Bedside and Verbal shift change report given to Ashley Webber (oncoming nurse) by Juany Caal RN  (offgoing nurse). Report included the following information SBAR, Kardex, Intake/Output, MAR and Recent Results.

## 2018-08-01 NOTE — PROGRESS NOTES
Post-Operative  Progress Note Information for the patient's :  Jigna Chacko, Male [[id number]] , Low Transverse Patient doing well without significant complaint. Nausea and vomiting resolved. She is tolerating oral intake. Pain well controlled. Delivery information: 
Information for the patient's :  Jigna Chacko Male [[id number]] Delivery of a 7 lb 13.2 oz (3.55 kg) male infant via , Low Transverse on 2018 at 4:48 AM  by . Apgars were 6 and 9. Vitals: 
Visit Vitals  /74 (BP 1 Location: Right arm, BP Patient Position: At rest)  Pulse (!) 51  Temp 98.5 °F (36.9 °C)  Resp 16  
 Ht 5' 6\" (1.676 m)  Wt 172 lb (78 kg)  LMP 10/25/2017 (Exact Date)  SpO2 96%  Breastfeeding Yes  BMI 27.76 kg/m2 Temp (24hrs), Av.2 °F (36.8 °C), Min:97.9 °F (36.6 °C), Max:98.5 °F (36.9 °C) Last 24hr Input/Output: 
No intake or output data in the 24 hours ending 18 1151 Exam:   
Gen: Patient without distress Lungs: clear to ascultation bilaterally Cor: S1, S2, regular rate and rhythm Abdomen: bowel sounds present, soft, appropriate tenderness, fundus firm Incision: clean, dry and intact Lower extremities: negative for cords or tenderness, trace edema bilaterally Labs:  
Lab Results Component Value Date/Time  WBC 10.9 2018 05:18 AM  
 WBC 9.5 2018 05:03 AM  
 WBC 9.8 2018 04:05 PM  
 WBC 8.6 2017 11:45 AM  
 WBC 9.6 2017 02:43 PM  
 WBC 8.6 2015 10:30 AM  
 HGB 9.9 (L) 2018 05:18 AM  
 HGB 12.9 2018 05:03 AM  
 HGB 11.8 2018 04:05 PM  
 HGB 13.4 2017 11:45 AM  
 HGB 13.6 2017 02:43 PM  
 HGB 13.5 2015 10:30 AM  
 HCT 29.7 (L) 2018 05:18 AM  
 HCT 39.7 2018 05:03 AM  
 HCT 35.4 2018 04:05 PM  
 HCT 42.2 2017 11:45 AM  
 HCT 41.8 2017 02:43 PM  
 HCT 39.6 2015 10:30 AM  
 PLATELET 185 (L)  05:18 AM  
 PLATELET 174 2018 05:03 AM  
 PLATELET 265  04:05 PM  
 PLATELET 083  11:45 AM  
 PLATELET 067  02:43 PM  
 PLATELET 726  10:30 AM  
 Hgb, External 11.8-on 2018 Hgb, External 11.8 2018 Hgb, External 13.4 2017 Hct, External 35.4- on 2018 Hct, External 35.4 2018 Hct, External 42.4 2017 Platelet cnt., External 190- on 2018 Platelet cnt., External 190 2018 Platelet cnt., External 265 2017 No results found for this or any previous visit (from the past 24 hour(s)). Assessment:  
Post-Op , stable POP anemia, acute intraoperative blood loss, stable Plan: 1. Routine post-operative care 2. Encouraged out of bed and ambulation 3. Oral pain medications 4. Advance diet 5. Continue postpartum and  teaching by nursing 6. The risks and benefits of the circumcision  procedure and anesthesia including: bleeding, infection, variability of cosmetic results were discussed at length with the mother. She is aware that future repeat procedures may be necessary. She gives informed consent to proceed as noted and her questions are answered.   
 
Charanjit Shipley MD

## 2018-08-01 NOTE — DISCHARGE INSTRUCTIONS
[facility]  [email or web address]  [phone]    Jaleel Martinez MD, FACOG     POST DELIVERY DISCHARGE INSTRUCTIONS  FROM YOUR PHYSICIAN    Name: Luis Daniel Baker  YOB: 1980    General:     Read all discharge information provided by the hospital    Diet/Diet Restrictions:  Eat healthy meals and snacks as desired. Eat foods that are high in fiber and low in fat and cholesterol. Drink eight 8-ounce glasses of water daily; avoid excessive caffeine intake. [email or web address]-[email or web address] html  EliteClients.be    Medications:   See discharge medication list and read instructions carefully. Breast Feeding:  See instructions from your lactation consult. Call [phone] for more information or to locate a lactation consultant. [email or web address].alas.info/    Vaccines:  If you received the MMR vaccine postpartum you should wait three months until you get pregnant again. You, and close contacts, should make sure that the Tdap vaccine is up to date. This vaccine can decrease the risk of your baby getting pertussis or \"whooping cough. \"    You, and close contacts, should receive the influenza vaccine during flu season when appropriate. SalaryStart.tn    Tobacco Use: If you (or other people around the baby) smoke or use tobacco products, please try to  use and quit to improve your health and decrease risk to your baby. LimitBuy.nl. htm    Swelling in your Legs:  There are many fluid changes after delivery and you may have more swelling the first few days after delivery  Continue to drink plenty of water, avoid sitting or standing in one position for too long and elevate your feet above your heart, to help reduce some the pressure you may be feeling in your ankles and legs.      Section Incision:  Steri-strips or tape strips may be removed gently at home approximately 7- 10 days after surgery. Soaking the strips with a warm, wet cloth or taking a shower may make the strips easier to remove. Metal staples are usually removed within 3 to 10 days, either before you leave the hospital or in the office. Make an appointment if needed. Insorb absorbable staples may be used under the skin but you may see small white pieces as they dissolve. Skin glue or dermabond will fall off with time. Abdominal incisions should be kept clean by showering. It is not necessary to put soap on the incision; plain tap water is adequate. Avoid scrubbing the area and pat dry. The way your scar looks will change over time and may not reach its final appearance for up to a year. The area may feel either numb or sensitive to touch, which is normal.         Physical Activity / Restrictions / Safety:     Avoid heavy lifting, no more that 10 pounds, for 2-3 weeks. No driving while taking narcotic pain medication, of if you can not slam on the brakes. No intercourse for 4-6 weeks, no douching or tampon use until seen by your doctor for your postpartum visit. Use condoms as needed for contraception with sexual activity. You may resume normal exercise after you are cleared by your physician at your postpartum check. You may walk for exercise, as tolerated. Discharge Instructions/Special Treatment/Home Care Needs:     Continue your prenatal vitamins while breast feeding or pumping. Continue to use a squirt bottle with warm water on your perineum/bottom/episiotomy after each bathroom use until bleeding stops. Take stool softeners daily. For example, docusate over the counter stool softener. This is especially important if you are taking narcotic pain medications, because they can cause constipation. Call your doctor for the following:      If you have a fever over 100.4 degrees by mouth on two readings. If you have persistent vaginal bleeding heavier than a heavy menstrual period or persistent large clots or if you are bleeding so heavy it is making you feel weak. It is normal to pass larger clots when you first get out of bed: but if they persist, notify your physician. If you have red streaks or increased swelling of legs, painful red streaks on your breast.  If you have painful urination, or increased pain, redness or discharge with your incision. If you have any questions or concerns. Pain Management:     Take Acetaminophen (Tylenol), Ibuprofen (Advil, Motrin), prescribed pain medications as directed for pain. Do not take Perocet with Tylenol, they both contain acetaminophen. Use a warm water Sitz bath 3 times daily to relieve episiotomy, bottom/perineum or hemorrhoidal discomfort. Apply heating pad to  incision as needed. For hemorrhoidal discomfort, you can use Tucks and Anusol cream as needed and directed.     NSAID information for patients:  [email or web address]    Pain medication/narcotic information for patients:   Take your medicine exactly as prescribed   Store your medicine away from children and in a safe  place   Do not give your medicine to others   Do not drink alcohol while taking this medicine    Follow-Up Care:     Appointment with MD:  Dr. Ansari Slice  [phone]  Schedule your postpartum visit for six weeks        Additional Discharge Instructions    Please read all of your discharge instructions  Follow all of your medication instructions carefully  Call our office on the next business day to schedule your follow-up appointment  If you have any questions or concerns, please contact us at [phone] or if the situation is urgent contact   Become a [address] My Chart user so you can access information, results and appointments: go to [email or web address] Joy. [email or web address]. The [facility] is to bring compassion to healthcare and to be good help to those in need. We aim at providing quality healthcare with an emphasis on respect, justice, compassion, stewardship, integrity, growth and innovation.     If you did not receive excellent communication, compassionate care and an outstanding patient experience, please notify Siena Whelan at [email or web address] or [phone] or discuss your concerns with me at your next visit so that we can meet our mission and your expectations    Dale Taylor MD  [address] [place] [address], [address]  [email or web address]   [phone]   Good Help to Those in [facility]

## 2019-02-28 ENCOUNTER — DOCUMENTATION ONLY (OUTPATIENT)
Dept: OBGYN CLINIC | Age: 39
End: 2019-02-28

## 2019-11-26 NOTE — ANESTHESIA PROCEDURE NOTES
CSE Block Start time: 7/28/2018 11:25 AM 
End time: 7/28/2018 11:42 AM 
Performed by: Jasmin Meraz Authorized by: Jasmin Meraz  
 
Pre-Procedure Indications: primary anesthetic   
preanesthetic checklist: patient identified, risks and benefits discussed, anesthesia consent, site marked, patient being monitored and timeout performed Timeout Time: 11:25 Procedure:  
Patient Position:  Seated Prep Region:  Lumbar Prep: patient draped and DuraPrep Location:  L3-4 Epidural Needle:  
Needle Type:  Tuohy Needle Gauge:  19 G Injection Technique:  Loss of resistance using saline Attempts:  1 Spinal Needle:  
Needle Type:  Pencan Needle Gauge:  25 G Catheter:  
Catheter Type:  Flex-tip Catheter Size:  20 G Catheter at Skin Depth (cm):  9 Events: no blood with aspiration, no cerebrospinal fluid with aspiration, no paresthesia, negative aspiration test and CSF confirmed Test Dose:  Lidocaine 1.5% w/ epi Assessment:  
Catheter Secured:  Tegaderm and tape Insertion:  Uncomplicated Patient tolerance:  Patient tolerated the procedure well with no immediate complications CANDICE at 3.5 n/a

## 2022-09-19 NOTE — TELEPHONE ENCOUNTER
LMTCB    (pt may need - [place])     ----- Message from Rocky Marin MD sent at 6/18/2017 10:50 PM EDT -----  The results are normal.   Update ob history  Please notify patient. Beta level c/w a complete miscarriage. Menses should resume within 2-6 wks, but notify MD if no menses with in 3 months. Rec consult visit to discuss future pregnancy options in 2-4 wks, or when and if pt desires. Render In Strict Bullet Format?: No Plan: Pt getting Clindamycin and spironolactone 1/5% topically through online dermatologist. Offered winlevi but will defer for now due to her just picking up rx.\\nDiscussed do not use when pregnant Detail Level: Zone

## 2025-03-18 NOTE — PROGRESS NOTES
_ [facility]  [email or web address]  [phone]    Gregg Saucedo MD, FACOG     Follow-up OB visit    Chief Complaint   Patient presents with    Routine Prenatal Visit       Vitals:    18 1449   BP: 110/72   Weight: 185 lb (83.9 kg)   Height: 5' 6\" (1.676 m)       Patient Active Problem List    Diagnosis Date Noted    Elderly multigravida in first trimester 2018    10 weeks gestation of pregnancy 2017       The patient reports the following concerns: Pt is very worried about the health of her baby with the GDM diagnosis. +2 ketones in urine. See PN flowsheet for exam    41 y.o.  30w4d   Encounter Diagnoses   Name Primary?  Elderly multigravida in first trimester Yes    30 weeks gestation of pregnancy     Diet controlled gestational diabetes mellitus (GDM) in third trimester      tdap  Disc rba of GDM: pt will keep MFM/endo/dtc fu  Disc healthy diet /exercise     [] SAB/bleeding precautions reviewed   [x] PTL/PPROM precautions reviewed   [] Labor precautions reviewed   [] Fetal kick counts discussed   [] Labs reviewed with patient   [] Coralee Brawn precautions reviewed   [] Consent reviewed   [] Handouts given to pt   [] Glucola handout    [] GBS/labor/Magic Hour handout   []    []    []    []    Follow-up Disposition:  Return in about 2 weeks (around 2018) for Follow up OB visit. No orders of the defined types were placed in this encounter.       Gregg Saucedo MD Samples Given: Aveeno eczema Discontinue Regimen: Opzelura 1.5 % topical cream (patient was not using) Detail Level: Zone Render In Strict Bullet Format?: No Initiate Treatment: Zoryve QD\\nAveeno daily eczema lotion Plan: Patient improved from previous visit, stable today. Active patches remain on bilateral arms and legs. Patient defers any systemic treatment at this time. Will continue with topicals, given Zoryve today. Discussed Aveeno daily eczema lotion as moisturizer.

## (undated) DEVICE — TIP CLEANER: Brand: VALLEYLAB

## (undated) DEVICE — BARRIER TISS ABSRB 5X6IN 1/PK -- DIRECT ORDER ONLY NON MEDLINE

## (undated) DEVICE — SUTURE MCRYL SZ 0 L36IN ABSRB UD L36MM CT-1 1/2 CIR Y946H

## (undated) DEVICE — MASTISOL ADHESIVE LIQ 2/3ML

## (undated) DEVICE — SUTURE PLN GUT SZ 2-0 L27IN ABSRB YELLOWISH TAN L70MM XLH 53T

## (undated) DEVICE — SUTURE VCRL SZ 2-0 L27IN ABSRB VLT L40MM CT 1/2 CIR J351H

## (undated) DEVICE — (D)PREP SKN CHLRAPRP APPL 26ML -- CONVERT TO ITEM 371833

## (undated) DEVICE — C-SECTION II-LF: Brand: MEDLINE INDUSTRIES, INC.

## (undated) DEVICE — Device: Brand: PORTEX

## (undated) DEVICE — SUTURE VCRL SZ 0 L36IN ABSRB VLT L36MM CT-1 1/2 CIR J346H

## (undated) DEVICE — KENDALL SCD EXPRESS SLEEVES, KNEE LENGTH, MEDIUM: Brand: KENDALL SCD

## (undated) DEVICE — SOLUTION IV 1000ML 0.9% SOD CHL

## (undated) DEVICE — HANDLE LT SNAP ON ULT DURABLE LENS FOR TRUMPF ALC DISPOSABLE

## (undated) DEVICE — POOLE SUCTION INSTRUMENT WITH REMOVABLE SHEATH: Brand: POOLE

## (undated) DEVICE — SOLIDIFIER FLUID 3000 CC ABSORB

## (undated) DEVICE — ROCKER SWITCH PENCIL HOLSTER: Brand: VALLEYLAB

## (undated) DEVICE — SUTURE MCRYL SZ 4-0 L27IN ABSRB UD L19MM PS-2 1/2 CIR PRIM Y426H

## (undated) DEVICE — SPONGE: LAP 18X18 W  200/CS: Brand: MEDICAL ACTION INDUSTRIES

## (undated) DEVICE — TOWEL,OR,DSP,ST,BLUE,STD,2/PK,40PK/CS: Brand: MEDLINE

## (undated) DEVICE — 3000CC GUARDIAN II: Brand: GUARDIAN